# Patient Record
Sex: FEMALE | Race: WHITE | Employment: OTHER | URBAN - METROPOLITAN AREA
[De-identification: names, ages, dates, MRNs, and addresses within clinical notes are randomized per-mention and may not be internally consistent; named-entity substitution may affect disease eponyms.]

---

## 2017-07-26 ENCOUNTER — TELEPHONE (OUTPATIENT)
Dept: CARDIOLOGY CLINIC | Age: 77
End: 2017-07-26

## 2017-08-01 ENCOUNTER — TELEPHONE (OUTPATIENT)
Dept: CARDIOLOGY CLINIC | Age: 77
End: 2017-08-01

## 2017-08-09 ENCOUNTER — TELEPHONE (OUTPATIENT)
Dept: CARDIOLOGY CLINIC | Age: 77
End: 2017-08-09

## 2017-08-09 DIAGNOSIS — I35.0 NODULAR CALCIFIC AORTIC VALVE STENOSIS: Primary | ICD-10-CM

## 2017-08-14 ENCOUNTER — PROCEDURE VISIT (OUTPATIENT)
Dept: CARDIOLOGY CLINIC | Age: 77
End: 2017-08-14

## 2017-08-14 DIAGNOSIS — I35.0 NODULAR CALCIFIC AORTIC VALVE STENOSIS: Primary | ICD-10-CM

## 2017-08-14 LAB
LV EF: 58 %
LVEF MODALITY: NORMAL

## 2017-08-14 PROCEDURE — 93306 TTE W/DOPPLER COMPLETE: CPT | Performed by: INTERNAL MEDICINE

## 2017-08-28 ENCOUNTER — INITIAL CONSULT (OUTPATIENT)
Dept: CARDIOLOGY CLINIC | Age: 77
End: 2017-08-28

## 2017-08-28 VITALS
HEIGHT: 60 IN | SYSTOLIC BLOOD PRESSURE: 122 MMHG | HEART RATE: 64 BPM | DIASTOLIC BLOOD PRESSURE: 76 MMHG | WEIGHT: 146 LBS | BODY MASS INDEX: 28.66 KG/M2

## 2017-08-28 DIAGNOSIS — R06.02 SOBOE (SHORTNESS OF BREATH ON EXERTION): ICD-10-CM

## 2017-08-28 DIAGNOSIS — I35.0 AORTIC VALVE STENOSIS, UNSPECIFIED ETIOLOGY: Primary | ICD-10-CM

## 2017-08-28 PROCEDURE — 1090F PRES/ABSN URINE INCON ASSESS: CPT | Performed by: INTERNAL MEDICINE

## 2017-08-28 PROCEDURE — 93000 ELECTROCARDIOGRAM COMPLETE: CPT | Performed by: INTERNAL MEDICINE

## 2017-08-28 PROCEDURE — 4040F PNEUMOC VAC/ADMIN/RCVD: CPT | Performed by: INTERNAL MEDICINE

## 2017-08-28 PROCEDURE — G8419 CALC BMI OUT NRM PARAM NOF/U: HCPCS | Performed by: INTERNAL MEDICINE

## 2017-08-28 PROCEDURE — 99204 OFFICE O/P NEW MOD 45 MIN: CPT | Performed by: INTERNAL MEDICINE

## 2017-08-28 PROCEDURE — G8427 DOCREV CUR MEDS BY ELIG CLIN: HCPCS | Performed by: INTERNAL MEDICINE

## 2017-08-28 PROCEDURE — 1036F TOBACCO NON-USER: CPT | Performed by: INTERNAL MEDICINE

## 2017-08-30 ENCOUNTER — PROCEDURE VISIT (OUTPATIENT)
Dept: CARDIOLOGY CLINIC | Age: 77
End: 2017-08-30

## 2017-08-30 DIAGNOSIS — R06.02 SOBOE (SHORTNESS OF BREATH ON EXERTION): ICD-10-CM

## 2017-08-30 DIAGNOSIS — I35.0 AORTIC VALVE STENOSIS, UNSPECIFIED ETIOLOGY: ICD-10-CM

## 2017-08-30 LAB
LV EF: 60 %
LVEF MODALITY: NORMAL

## 2017-08-30 PROCEDURE — 93018 CV STRESS TEST I&R ONLY: CPT | Performed by: INTERNAL MEDICINE

## 2017-08-30 PROCEDURE — 93016 CV STRESS TEST SUPVJ ONLY: CPT | Performed by: INTERNAL MEDICINE

## 2017-08-30 PROCEDURE — A9500 TC99M SESTAMIBI: HCPCS | Performed by: INTERNAL MEDICINE

## 2017-08-30 PROCEDURE — 93017 CV STRESS TEST TRACING ONLY: CPT | Performed by: INTERNAL MEDICINE

## 2017-08-30 PROCEDURE — 78452 HT MUSCLE IMAGE SPECT MULT: CPT | Performed by: INTERNAL MEDICINE

## 2017-09-01 ENCOUNTER — TELEPHONE (OUTPATIENT)
Dept: CARDIOLOGY CLINIC | Age: 77
End: 2017-09-01

## 2017-09-27 ENCOUNTER — OFFICE VISIT (OUTPATIENT)
Dept: CARDIOLOGY CLINIC | Age: 77
End: 2017-09-27

## 2017-09-27 VITALS
HEART RATE: 60 BPM | DIASTOLIC BLOOD PRESSURE: 68 MMHG | SYSTOLIC BLOOD PRESSURE: 118 MMHG | HEIGHT: 61 IN | BODY MASS INDEX: 27.03 KG/M2 | WEIGHT: 143.2 LBS

## 2017-09-27 DIAGNOSIS — I35.0 AORTIC VALVE STENOSIS, UNSPECIFIED ETIOLOGY: Primary | ICD-10-CM

## 2017-09-27 PROCEDURE — 1036F TOBACCO NON-USER: CPT | Performed by: INTERNAL MEDICINE

## 2017-09-27 PROCEDURE — G8400 PT W/DXA NO RESULTS DOC: HCPCS | Performed by: INTERNAL MEDICINE

## 2017-09-27 PROCEDURE — 1123F ACP DISCUSS/DSCN MKR DOCD: CPT | Performed by: INTERNAL MEDICINE

## 2017-09-27 PROCEDURE — G8419 CALC BMI OUT NRM PARAM NOF/U: HCPCS | Performed by: INTERNAL MEDICINE

## 2017-09-27 PROCEDURE — 4040F PNEUMOC VAC/ADMIN/RCVD: CPT | Performed by: INTERNAL MEDICINE

## 2017-09-27 PROCEDURE — G8428 CUR MEDS NOT DOCUMENT: HCPCS | Performed by: INTERNAL MEDICINE

## 2017-09-27 PROCEDURE — 99214 OFFICE O/P EST MOD 30 MIN: CPT | Performed by: INTERNAL MEDICINE

## 2017-09-27 PROCEDURE — 1090F PRES/ABSN URINE INCON ASSESS: CPT | Performed by: INTERNAL MEDICINE

## 2018-04-24 ENCOUNTER — TELEPHONE (OUTPATIENT)
Dept: CARDIOLOGY CLINIC | Age: 78
End: 2018-04-24

## 2018-06-26 ENCOUNTER — OFFICE VISIT (OUTPATIENT)
Dept: CARDIOLOGY CLINIC | Age: 78
End: 2018-06-26

## 2018-06-26 VITALS
HEIGHT: 60 IN | SYSTOLIC BLOOD PRESSURE: 130 MMHG | HEART RATE: 60 BPM | WEIGHT: 144 LBS | BODY MASS INDEX: 28.27 KG/M2 | DIASTOLIC BLOOD PRESSURE: 78 MMHG

## 2018-06-26 DIAGNOSIS — I35.0 AORTIC VALVE STENOSIS, ETIOLOGY OF CARDIAC VALVE DISEASE UNSPECIFIED: Primary | ICD-10-CM

## 2018-06-26 DIAGNOSIS — R42 DIZZINESS: ICD-10-CM

## 2018-06-26 PROCEDURE — 99213 OFFICE O/P EST LOW 20 MIN: CPT | Performed by: INTERNAL MEDICINE

## 2018-06-26 PROCEDURE — 1123F ACP DISCUSS/DSCN MKR DOCD: CPT | Performed by: INTERNAL MEDICINE

## 2018-06-26 PROCEDURE — G8400 PT W/DXA NO RESULTS DOC: HCPCS | Performed by: INTERNAL MEDICINE

## 2018-06-26 PROCEDURE — 1036F TOBACCO NON-USER: CPT | Performed by: INTERNAL MEDICINE

## 2018-06-26 PROCEDURE — G8419 CALC BMI OUT NRM PARAM NOF/U: HCPCS | Performed by: INTERNAL MEDICINE

## 2018-06-26 PROCEDURE — 1090F PRES/ABSN URINE INCON ASSESS: CPT | Performed by: INTERNAL MEDICINE

## 2018-06-26 PROCEDURE — 4040F PNEUMOC VAC/ADMIN/RCVD: CPT | Performed by: INTERNAL MEDICINE

## 2018-06-26 PROCEDURE — G8427 DOCREV CUR MEDS BY ELIG CLIN: HCPCS | Performed by: INTERNAL MEDICINE

## 2018-06-26 RX ORDER — OXYBUTYNIN CHLORIDE 5 MG/1
5 TABLET ORAL 2 TIMES DAILY
COMMUNITY
Start: 2018-06-08

## 2018-07-06 ENCOUNTER — PROCEDURE VISIT (OUTPATIENT)
Dept: CARDIOLOGY CLINIC | Age: 78
End: 2018-07-06

## 2018-07-06 DIAGNOSIS — I35.0 AORTIC VALVE STENOSIS, ETIOLOGY OF CARDIAC VALVE DISEASE UNSPECIFIED: Primary | ICD-10-CM

## 2018-07-06 LAB
LV EF: 58 %
LVEF MODALITY: NORMAL

## 2018-07-06 PROCEDURE — 93306 TTE W/DOPPLER COMPLETE: CPT | Performed by: INTERNAL MEDICINE

## 2018-07-10 ENCOUNTER — TELEPHONE (OUTPATIENT)
Dept: CARDIOLOGY CLINIC | Age: 78
End: 2018-07-10

## 2018-07-10 ENCOUNTER — PROCEDURE VISIT (OUTPATIENT)
Dept: CARDIOLOGY CLINIC | Age: 78
End: 2018-07-10

## 2018-07-10 DIAGNOSIS — R42 DIZZINESS: Primary | ICD-10-CM

## 2018-07-10 PROCEDURE — 93880 EXTRACRANIAL BILAT STUDY: CPT | Performed by: INTERNAL MEDICINE

## 2018-07-10 NOTE — TELEPHONE ENCOUNTER
Left message on voice mail for patient to return call regarding results of echo.     Conclusions      Summary   Left ventricular systolic function is normal with an ejection fraction of   55-60%.    Grade I diastolic dysfunction.   severe aortic stenosis is present.   Doppler evaluation reveals moderate aortic, mild mitral, mild to moderate   tricuspid, and mild pulmonic regurgitation.   Right ventricular systolic pressure of 37 mmHg consistent with mild   pulmonary hypertension.   No evidence of pericardial effusion.   ov to discuss results

## 2018-07-11 ENCOUNTER — TELEPHONE (OUTPATIENT)
Dept: CARDIOLOGY CLINIC | Age: 78
End: 2018-07-11

## 2018-07-11 NOTE — TELEPHONE ENCOUNTER
Mild (0-49%) disease of the Bilateral Internal carotid artery.    Normal vertebral flow.      Left message with results

## 2018-07-20 NOTE — TELEPHONE ENCOUNTER
Spoke to patient regarding results of echo and carotid. Pt has an appt 7/30/18 regarding results of echo.

## 2018-07-30 ENCOUNTER — OFFICE VISIT (OUTPATIENT)
Dept: CARDIOLOGY CLINIC | Age: 78
End: 2018-07-30

## 2018-07-30 VITALS
BODY MASS INDEX: 29.06 KG/M2 | DIASTOLIC BLOOD PRESSURE: 70 MMHG | WEIGHT: 148 LBS | HEIGHT: 60 IN | SYSTOLIC BLOOD PRESSURE: 130 MMHG | HEART RATE: 76 BPM

## 2018-07-30 DIAGNOSIS — I35.0 AORTIC VALVE STENOSIS, ETIOLOGY OF CARDIAC VALVE DISEASE UNSPECIFIED: Primary | ICD-10-CM

## 2018-07-30 PROCEDURE — 1036F TOBACCO NON-USER: CPT | Performed by: INTERNAL MEDICINE

## 2018-07-30 PROCEDURE — 99214 OFFICE O/P EST MOD 30 MIN: CPT | Performed by: INTERNAL MEDICINE

## 2018-07-30 PROCEDURE — 1101F PT FALLS ASSESS-DOCD LE1/YR: CPT | Performed by: INTERNAL MEDICINE

## 2018-07-30 PROCEDURE — G8400 PT W/DXA NO RESULTS DOC: HCPCS | Performed by: INTERNAL MEDICINE

## 2018-07-30 PROCEDURE — G8419 CALC BMI OUT NRM PARAM NOF/U: HCPCS | Performed by: INTERNAL MEDICINE

## 2018-07-30 PROCEDURE — 1123F ACP DISCUSS/DSCN MKR DOCD: CPT | Performed by: INTERNAL MEDICINE

## 2018-07-30 PROCEDURE — G8427 DOCREV CUR MEDS BY ELIG CLIN: HCPCS | Performed by: INTERNAL MEDICINE

## 2018-07-30 PROCEDURE — 4040F PNEUMOC VAC/ADMIN/RCVD: CPT | Performed by: INTERNAL MEDICINE

## 2018-07-30 PROCEDURE — 1090F PRES/ABSN URINE INCON ASSESS: CPT | Performed by: INTERNAL MEDICINE

## 2018-07-30 NOTE — PROGRESS NOTES
valvular aortic stenosis. Dustin Melida H/O echocardiogram 08/14/2017    EF55-60% mod-severe AS, mild AR, mild-mod MR    History of blood transfusion     Hx of cardiovascular stress test 08/30/2017    EF 60% normal study    Hx of Doppler echocardiogram 07/06/2018    EF 55-60%  mod AR, mild MR, mild to mod TR, and mild pulmonic regurg. Mild pulmonary htn.  Hyperlipemia     Hypertension     Malaise and fatigue     Osteoarthritis     Restless leg     Sleep disorder     Spinal stenosis     VHD (valvular heart disease)      Past Surgical History:   Procedure Laterality Date    HYSTERECTOMY      OTHER SURGICAL HISTORY Left     Ear surgery     Family History   Problem Relation Age of Onset    Other Father         appendix ruptured    Heart Failure Mother         CHF    Hypertension Mother     Cancer Sister      Social History   Substance Use Topics    Smoking status: Never Smoker    Smokeless tobacco: Never Used    Alcohol use No      Comment: CAFFEINE: 3 cups daily        Review of Systems:   All 14 systems reviewed, all are negative   Objective:      Physical Exam:  /70   Pulse 76   Ht 5' (1.524 m)   Wt 148 lb (67.1 kg)   BMI 28.90 kg/m²   Wt Readings from Last 3 Encounters:   07/30/18 148 lb (67.1 kg)   06/26/18 144 lb (65.3 kg)   09/27/17 143 lb 3.2 oz (65 kg)     Body mass index is 28.9 kg/m². GENERAL - Alert, oriented, pleasant, in no apparent distress. Head unremarkable  Eyes  Not injected conjunctiva  ENT  normal mucosa  Neck - Supple. No jugular venous distention noted. No carotid bruits. Cardiovascular  Normal S1 and S2 with 3/6 THEODORE    Extremities - No cyanosis, clubbing, or significant edema. Pulmonary  No respiratory distress. No wheezes or rales. Pulses: Bilateral radial and pedal pulses normal  Abdomen  no tenderness  Musculoskeletal  normal strength  Neurologic    There are  no gross focal neurologic abnormalities.   Skin-  No rash  Affect; normal mood    DATA:  No results found for: CKTOTAL, CKMB, CKMBINDEX, TROPONINI  BNP:  No results found for: BNP  PT/INR:  No results found for: PTINR  No results found for: LABA1C  Lab Results   Component Value Date    CHOL 197 08/02/2010    TRIG 94 08/02/2010    HDL 80 (A) 08/02/2010    LDLCALC 98 08/02/2010     Lab Results   Component Value Date    ALT 25 08/02/2010    AST 30 08/02/2010     TSH:  No results found for: TSH      QUALITY MEASURES:  CAD:  No   CHOL LOWERING:  No- if No Why  ANTIPLATELET:  No - if No why  BETA BLOCKER    no  IF  NO WHY  SMOKING HISTORY no COUNSELLED no  ATRIAL FIBRILLATIONno ANTICOAG: no    Assessment/ Plan:     Patient seen , interviewed and examined      -  Hypertension: Patients blood pressure is normal. Patient is advised about low sodium diet. Present medical regimen will not be changed. -  LIPID MANAGEMENT:  Available lipid  lab data reviewed  and patient was given dietary advice. NCEP- ATP III guidelines reviewed with patient.     -   Changes  in medicines made: No        - VHD   Severe AS   On current echo  TAVR DW pt again, pt wants to wait till November 18

## 2018-08-20 ENCOUNTER — HOSPITAL ENCOUNTER (OUTPATIENT)
Dept: GENERAL RADIOLOGY | Age: 78
Discharge: HOME | End: 2018-08-20
Attending: FAMILY MEDICINE | Admitting: FAMILY MEDICINE

## 2018-08-20 LAB
ALBUMIN SERPL-MCNC: 4 GM/DL (ref 3.4–5)
ALP BLD-CCNC: 61 IU/L (ref 40–128)
ALT SERPL-CCNC: 14 U/L (ref 10–40)
ANION GAP SERPL CALCULATED.3IONS-SCNC: 15 MMOL/L (ref 4–16)
AST SERPL-CCNC: 22 IU/L (ref 15–37)
BASOPHILS ABSOLUTE: 0 K/CU MM
BASOPHILS RELATIVE PERCENT: 0.4 % (ref 0–1)
BILIRUB SERPL-MCNC: 0.8 MG/DL (ref 0–1)
BUN BLDV-MCNC: 19 MG/DL (ref 6–23)
CALCIUM SERPL-MCNC: 9.4 MG/DL (ref 8.3–10.6)
CHLORIDE BLD-SCNC: 102 MMOL/L (ref 99–110)
CHOLESTEROL: 167 MG/DL
CO2: 26 MMOL/L (ref 21–32)
CREAT SERPL-MCNC: 0.7 MG/DL (ref 0.6–1.1)
DIFFERENTIAL TYPE: ABNORMAL
EOSINOPHILS ABSOLUTE: 0.2 K/CU MM
EOSINOPHILS RELATIVE PERCENT: 3.2 % (ref 0–3)
ESTIMATED AVERAGE GLUCOSE: 117 MG/DL
GFR AFRICAN AMERICAN: >60 ML/MIN/1.73M2
GFR NON-AFRICAN AMERICAN: >60 ML/MIN/1.73M2
GLUCOSE BLD-MCNC: 91 MG/DL (ref 70–99)
HBA1C MFR BLD: 5.7 % (ref 4.2–6.3)
HCT VFR BLD CALC: 41.4 % (ref 37–47)
HDLC SERPL-MCNC: 52 MG/DL
HEMOGLOBIN: 13.4 GM/DL (ref 12.5–16)
IMMATURE NEUTROPHIL %: 0.3 % (ref 0–0.43)
LDL CHOLESTEROL DIRECT: 114 MG/DL
LYMPHOCYTES ABSOLUTE: 2.7 K/CU MM
LYMPHOCYTES RELATIVE PERCENT: 35.7 % (ref 24–44)
MCH RBC QN AUTO: 30.6 PG (ref 27–31)
MCHC RBC AUTO-ENTMCNC: 32.4 % (ref 32–36)
MCV RBC AUTO: 94.5 FL (ref 78–100)
MONOCYTES ABSOLUTE: 0.5 K/CU MM
MONOCYTES RELATIVE PERCENT: 6.8 % (ref 0–4)
NUCLEATED RBC %: 0 %
PDW BLD-RTO: 14.1 % (ref 11.7–14.9)
PLATELET # BLD: 213 K/CU MM (ref 140–440)
PMV BLD AUTO: 11.2 FL (ref 7.5–11.1)
POTASSIUM SERPL-SCNC: 4.4 MMOL/L (ref 3.5–5.1)
RBC # BLD: 4.38 M/CU MM (ref 4.2–5.4)
SEGMENTED NEUTROPHILS ABSOLUTE COUNT: 4 K/CU MM
SEGMENTED NEUTROPHILS RELATIVE PERCENT: 53.6 % (ref 36–66)
SODIUM BLD-SCNC: 143 MMOL/L (ref 135–145)
TOTAL IMMATURE NEUTOROPHIL: 0.02 K/CU MM
TOTAL NUCLEATED RBC: 0 K/CU MM
TOTAL PROTEIN: 6.1 GM/DL (ref 6.4–8.2)
TRIGL SERPL-MCNC: 108 MG/DL
TSH HIGH SENSITIVITY: 1.51 UIU/ML (ref 0.27–4.2)
WBC # BLD: 7.5 K/CU MM (ref 4–10.5)

## 2018-09-18 ENCOUNTER — TELEPHONE (OUTPATIENT)
Dept: CARDIOLOGY CLINIC | Age: 78
End: 2018-09-18

## 2018-09-19 ENCOUNTER — TELEPHONE (OUTPATIENT)
Dept: CARDIOLOGY CLINIC | Age: 78
End: 2018-09-19

## 2018-09-19 NOTE — TELEPHONE ENCOUNTER
Mailed out CD of echo from [facility] to:      [facility]  structual heart disease  Attn Jeremy [facility]avidez [facility] [address] [facility] , [address] [address] [place]

## 2018-09-26 LAB
BASOPHILS ABSOLUTE: NORMAL /ΜL
BASOPHILS RELATIVE PERCENT: NORMAL %
EOSINOPHILS ABSOLUTE: NORMAL /ΜL
EOSINOPHILS RELATIVE PERCENT: NORMAL %
HCT VFR BLD CALC: 40.1 % (ref 36–46)
HEMOGLOBIN: 13.3 G/DL (ref 12–16)
LYMPHOCYTES ABSOLUTE: NORMAL /ΜL
LYMPHOCYTES RELATIVE PERCENT: NORMAL %
MCH RBC QN AUTO: NORMAL PG
MCHC RBC AUTO-ENTMCNC: NORMAL G/DL
MCV RBC AUTO: NORMAL FL
MONOCYTES ABSOLUTE: NORMAL /ΜL
MONOCYTES RELATIVE PERCENT: NORMAL %
NEUTROPHILS ABSOLUTE: NORMAL /ΜL
NEUTROPHILS RELATIVE PERCENT: NORMAL %
PLATELET # BLD: 222 K/ΜL
PMV BLD AUTO: NORMAL FL
RBC # BLD: 4.3 10^6/ΜL
WBC # BLD: 7.35 10^3/ML

## 2018-11-07 LAB
ALBUMIN SERPL-MCNC: 4.3 G/DL
ALP BLD-CCNC: 65 U/L
ALT SERPL-CCNC: 18 U/L
ANION GAP SERPL CALCULATED.3IONS-SCNC: NORMAL MMOL/L
AST SERPL-CCNC: 22 U/L
BASOPHILS ABSOLUTE: NORMAL /ΜL
BASOPHILS RELATIVE PERCENT: NORMAL %
BILIRUB SERPL-MCNC: 0.5 MG/DL (ref 0.1–1.4)
BUN BLDV-MCNC: 17 MG/DL
CALCIUM SERPL-MCNC: NORMAL MG/DL
CHLORIDE BLD-SCNC: 102 MMOL/L
CO2: NORMAL MMOL/L
CREAT SERPL-MCNC: 0.72 MG/DL
EOSINOPHILS ABSOLUTE: NORMAL /ΜL
EOSINOPHILS RELATIVE PERCENT: NORMAL %
GFR CALCULATED: NORMAL
GLUCOSE BLD-MCNC: 97 MG/DL
HCT VFR BLD CALC: 39 % (ref 36–46)
HEMOGLOBIN: 13.2 G/DL (ref 12–16)
LYMPHOCYTES ABSOLUTE: NORMAL /ΜL
LYMPHOCYTES RELATIVE PERCENT: NORMAL %
MCH RBC QN AUTO: NORMAL PG
MCHC RBC AUTO-ENTMCNC: NORMAL G/DL
MCV RBC AUTO: NORMAL FL
MONOCYTES ABSOLUTE: NORMAL /ΜL
MONOCYTES RELATIVE PERCENT: NORMAL %
NEUTROPHILS ABSOLUTE: NORMAL /ΜL
NEUTROPHILS RELATIVE PERCENT: NORMAL %
PLATELET # BLD: 247 K/ΜL
PMV BLD AUTO: NORMAL FL
POTASSIUM SERPL-SCNC: 4.8 MMOL/L
RBC # BLD: 4.2 10^6/ΜL
SODIUM BLD-SCNC: 140 MMOL/L
TOTAL PROTEIN: NORMAL
WBC # BLD: 8.6 10^3/ML

## 2018-12-12 LAB
BASOPHILS ABSOLUTE: NORMAL /ΜL
BASOPHILS RELATIVE PERCENT: NORMAL %
BUN BLDV-MCNC: 16 MG/DL
CALCIUM SERPL-MCNC: 9.1 MG/DL
CHLORIDE BLD-SCNC: 105 MMOL/L
CO2: 25 MMOL/L
CREAT SERPL-MCNC: 0.66 MG/DL
EOSINOPHILS ABSOLUTE: NORMAL /ΜL
EOSINOPHILS RELATIVE PERCENT: NORMAL %
GFR CALCULATED: NORMAL
GLUCOSE BLD-MCNC: 62 MG/DL
HCT VFR BLD CALC: 38.3 % (ref 36–46)
HEMOGLOBIN: 12.6 G/DL (ref 12–16)
LYMPHOCYTES ABSOLUTE: NORMAL /ΜL
LYMPHOCYTES RELATIVE PERCENT: NORMAL %
MCH RBC QN AUTO: NORMAL PG
MCHC RBC AUTO-ENTMCNC: NORMAL G/DL
MCV RBC AUTO: NORMAL FL
MONOCYTES ABSOLUTE: NORMAL /ΜL
MONOCYTES RELATIVE PERCENT: NORMAL %
NEUTROPHILS ABSOLUTE: NORMAL /ΜL
NEUTROPHILS RELATIVE PERCENT: NORMAL %
PLATELET # BLD: 193 K/ΜL
PMV BLD AUTO: NORMAL FL
POTASSIUM SERPL-SCNC: 3.8 MMOL/L
RBC # BLD: 4.02 10^6/ΜL
SODIUM BLD-SCNC: 142 MMOL/L
WBC # BLD: 6.11 10^3/ML

## 2018-12-14 ENCOUNTER — HOSPITAL ENCOUNTER (OUTPATIENT)
Dept: CARDIAC REHAB | Age: 78
Setting detail: THERAPIES SERIES
Discharge: HOME | End: 2018-12-14
Payer: MEDICARE

## 2019-01-07 ENCOUNTER — HOSPITAL ENCOUNTER (OUTPATIENT)
Dept: CARDIAC REHAB | Age: 79
Setting detail: THERAPIES SERIES
Discharge: HOME | End: 2019-01-07
Payer: MEDICARE

## 2019-01-07 PROCEDURE — 93798 PHYS/QHP OP CAR RHAB W/ECG: CPT

## 2019-01-08 ENCOUNTER — HOSPITAL ENCOUNTER (OUTPATIENT)
Dept: CARDIAC REHAB | Age: 79
Setting detail: THERAPIES SERIES
Discharge: HOME | End: 2019-01-08
Payer: MEDICARE

## 2019-01-10 ENCOUNTER — HOSPITAL ENCOUNTER (OUTPATIENT)
Dept: CARDIAC REHAB | Age: 79
Setting detail: THERAPIES SERIES
Discharge: HOME | End: 2019-01-10
Payer: MEDICARE

## 2019-01-15 ENCOUNTER — HOSPITAL ENCOUNTER (OUTPATIENT)
Dept: CARDIAC REHAB | Age: 79
Setting detail: THERAPIES SERIES
Discharge: HOME | End: 2019-01-15
Payer: MEDICARE

## 2019-01-15 PROCEDURE — 93798 PHYS/QHP OP CAR RHAB W/ECG: CPT

## 2019-01-17 ENCOUNTER — HOSPITAL ENCOUNTER (OUTPATIENT)
Dept: CARDIAC REHAB | Age: 79
Setting detail: THERAPIES SERIES
Discharge: HOME | End: 2019-01-17
Payer: MEDICARE

## 2019-01-17 PROCEDURE — 93798 PHYS/QHP OP CAR RHAB W/ECG: CPT

## 2019-01-21 ENCOUNTER — APPOINTMENT (OUTPATIENT)
Dept: CARDIAC REHAB | Age: 79
End: 2019-01-21
Payer: MEDICARE

## 2019-01-22 ENCOUNTER — APPOINTMENT (OUTPATIENT)
Dept: CARDIAC REHAB | Age: 79
End: 2019-01-22
Payer: MEDICARE

## 2019-01-24 ENCOUNTER — APPOINTMENT (OUTPATIENT)
Dept: CARDIAC REHAB | Age: 79
End: 2019-01-24
Payer: MEDICARE

## 2019-01-28 ENCOUNTER — HOSPITAL ENCOUNTER (OUTPATIENT)
Dept: CARDIAC REHAB | Age: 79
Setting detail: THERAPIES SERIES
Discharge: HOME | End: 2019-01-28
Payer: MEDICARE

## 2019-01-28 PROCEDURE — 93798 PHYS/QHP OP CAR RHAB W/ECG: CPT

## 2019-01-29 ENCOUNTER — HOSPITAL ENCOUNTER (OUTPATIENT)
Dept: CARDIAC REHAB | Age: 79
Setting detail: THERAPIES SERIES
Discharge: HOME | End: 2019-01-29
Payer: MEDICARE

## 2019-01-29 PROCEDURE — 93798 PHYS/QHP OP CAR RHAB W/ECG: CPT

## 2019-02-04 ENCOUNTER — HOSPITAL ENCOUNTER (OUTPATIENT)
Dept: CARDIAC REHAB | Age: 79
Setting detail: THERAPIES SERIES
Discharge: HOME | End: 2019-02-04
Payer: MEDICARE

## 2019-02-04 PROCEDURE — 93798 PHYS/QHP OP CAR RHAB W/ECG: CPT

## 2019-02-05 ENCOUNTER — HOSPITAL ENCOUNTER (OUTPATIENT)
Dept: CARDIAC REHAB | Age: 79
Setting detail: THERAPIES SERIES
Discharge: HOME | End: 2019-02-05
Payer: MEDICARE

## 2019-02-05 PROCEDURE — 93798 PHYS/QHP OP CAR RHAB W/ECG: CPT

## 2019-02-07 ENCOUNTER — APPOINTMENT (OUTPATIENT)
Dept: CARDIAC REHAB | Age: 79
End: 2019-02-07
Payer: MEDICARE

## 2019-02-11 ENCOUNTER — HOSPITAL ENCOUNTER (OUTPATIENT)
Dept: CARDIAC REHAB | Age: 79
Setting detail: THERAPIES SERIES
Discharge: HOME | End: 2019-02-11
Payer: MEDICARE

## 2019-02-11 PROCEDURE — 93798 PHYS/QHP OP CAR RHAB W/ECG: CPT

## 2019-02-12 ENCOUNTER — HOSPITAL ENCOUNTER (OUTPATIENT)
Dept: CARDIAC REHAB | Age: 79
Setting detail: THERAPIES SERIES
Discharge: HOME | End: 2019-02-12
Payer: MEDICARE

## 2019-02-12 PROCEDURE — 93798 PHYS/QHP OP CAR RHAB W/ECG: CPT

## 2019-02-14 ENCOUNTER — HOSPITAL ENCOUNTER (OUTPATIENT)
Dept: CARDIAC REHAB | Age: 79
Setting detail: THERAPIES SERIES
Discharge: HOME | End: 2019-02-14
Payer: MEDICARE

## 2019-02-14 PROCEDURE — 93798 PHYS/QHP OP CAR RHAB W/ECG: CPT

## 2019-02-18 ENCOUNTER — APPOINTMENT (OUTPATIENT)
Dept: CARDIAC REHAB | Age: 79
End: 2019-02-18
Payer: MEDICARE

## 2019-02-19 ENCOUNTER — HOSPITAL ENCOUNTER (OUTPATIENT)
Dept: CARDIAC REHAB | Age: 79
Setting detail: THERAPIES SERIES
Discharge: HOME | End: 2019-02-19
Payer: MEDICARE

## 2019-02-19 PROCEDURE — 93798 PHYS/QHP OP CAR RHAB W/ECG: CPT

## 2019-02-21 ENCOUNTER — HOSPITAL ENCOUNTER (OUTPATIENT)
Dept: CARDIAC REHAB | Age: 79
Setting detail: THERAPIES SERIES
Discharge: HOME | End: 2019-02-21
Payer: MEDICARE

## 2019-02-21 PROCEDURE — 93798 PHYS/QHP OP CAR RHAB W/ECG: CPT

## 2019-02-25 ENCOUNTER — HOSPITAL ENCOUNTER (OUTPATIENT)
Dept: CARDIAC REHAB | Age: 79
Setting detail: THERAPIES SERIES
Discharge: HOME | End: 2019-02-25
Payer: MEDICARE

## 2019-02-25 PROCEDURE — 93798 PHYS/QHP OP CAR RHAB W/ECG: CPT

## 2019-02-26 ENCOUNTER — HOSPITAL ENCOUNTER (OUTPATIENT)
Dept: CARDIAC REHAB | Age: 79
Setting detail: THERAPIES SERIES
Discharge: HOME | End: 2019-02-26
Payer: MEDICARE

## 2019-02-26 PROCEDURE — 93798 PHYS/QHP OP CAR RHAB W/ECG: CPT

## 2019-02-28 ENCOUNTER — HOSPITAL ENCOUNTER (OUTPATIENT)
Dept: CARDIAC REHAB | Age: 79
Setting detail: THERAPIES SERIES
Discharge: HOME | End: 2019-02-28
Payer: MEDICARE

## 2019-02-28 PROCEDURE — 93798 PHYS/QHP OP CAR RHAB W/ECG: CPT

## 2019-03-07 ENCOUNTER — APPOINTMENT (OUTPATIENT)
Dept: CARDIAC REHAB | Age: 79
End: 2019-03-07
Payer: MEDICARE

## 2019-03-11 ENCOUNTER — APPOINTMENT (OUTPATIENT)
Dept: CARDIAC REHAB | Age: 79
End: 2019-03-11
Payer: MEDICARE

## 2019-03-12 ENCOUNTER — APPOINTMENT (OUTPATIENT)
Dept: CARDIAC REHAB | Age: 79
End: 2019-03-12
Payer: MEDICARE

## 2019-03-14 ENCOUNTER — APPOINTMENT (OUTPATIENT)
Dept: CARDIAC REHAB | Age: 79
End: 2019-03-14
Payer: MEDICARE

## 2019-03-18 ENCOUNTER — APPOINTMENT (OUTPATIENT)
Dept: CARDIAC REHAB | Age: 79
End: 2019-03-18
Payer: MEDICARE

## 2019-03-19 ENCOUNTER — APPOINTMENT (OUTPATIENT)
Dept: CARDIAC REHAB | Age: 79
End: 2019-03-19
Payer: MEDICARE

## 2019-03-21 ENCOUNTER — APPOINTMENT (OUTPATIENT)
Dept: CARDIAC REHAB | Age: 79
End: 2019-03-21
Payer: MEDICARE

## 2019-03-25 ENCOUNTER — APPOINTMENT (OUTPATIENT)
Dept: CARDIAC REHAB | Age: 79
End: 2019-03-25
Payer: MEDICARE

## 2019-03-26 ENCOUNTER — APPOINTMENT (OUTPATIENT)
Dept: CARDIAC REHAB | Age: 79
End: 2019-03-26
Payer: MEDICARE

## 2019-03-28 ENCOUNTER — APPOINTMENT (OUTPATIENT)
Dept: CARDIAC REHAB | Age: 79
End: 2019-03-28
Payer: MEDICARE

## 2019-03-28 ENCOUNTER — HOSPITAL ENCOUNTER (OUTPATIENT)
Dept: CARDIAC REHAB | Age: 79
Setting detail: THERAPIES SERIES
Discharge: HOME | End: 2019-03-28
Payer: MEDICARE

## 2019-03-28 PROCEDURE — 93798 PHYS/QHP OP CAR RHAB W/ECG: CPT

## 2019-04-22 ENCOUNTER — HOSPITAL ENCOUNTER (OUTPATIENT)
Dept: CARDIAC REHAB | Age: 79
Setting detail: THERAPIES SERIES
Discharge: HOME | End: 2019-04-22
Payer: MEDICARE

## 2019-04-22 PROCEDURE — 93798 PHYS/QHP OP CAR RHAB W/ECG: CPT

## 2019-04-23 ENCOUNTER — HOSPITAL ENCOUNTER (OUTPATIENT)
Dept: CARDIAC REHAB | Age: 79
Setting detail: THERAPIES SERIES
Discharge: HOME | End: 2019-04-23
Payer: MEDICARE

## 2019-04-23 PROCEDURE — 93798 PHYS/QHP OP CAR RHAB W/ECG: CPT

## 2019-04-25 ENCOUNTER — HOSPITAL ENCOUNTER (OUTPATIENT)
Dept: CARDIAC REHAB | Age: 79
Setting detail: THERAPIES SERIES
Discharge: HOME | End: 2019-04-25
Payer: MEDICARE

## 2019-04-25 PROCEDURE — 93798 PHYS/QHP OP CAR RHAB W/ECG: CPT

## 2019-04-29 ENCOUNTER — HOSPITAL ENCOUNTER (OUTPATIENT)
Dept: CARDIAC REHAB | Age: 79
Setting detail: THERAPIES SERIES
Discharge: HOME | End: 2019-04-29
Payer: MEDICARE

## 2019-04-29 PROCEDURE — 93798 PHYS/QHP OP CAR RHAB W/ECG: CPT

## 2019-04-30 ENCOUNTER — APPOINTMENT (OUTPATIENT)
Dept: CARDIAC REHAB | Age: 79
End: 2019-04-30
Payer: MEDICARE

## 2019-05-02 ENCOUNTER — HOSPITAL ENCOUNTER (OUTPATIENT)
Dept: CARDIAC REHAB | Age: 79
Setting detail: THERAPIES SERIES
Discharge: HOME | End: 2019-05-02
Payer: MEDICARE

## 2019-05-02 PROCEDURE — 93798 PHYS/QHP OP CAR RHAB W/ECG: CPT

## 2019-05-06 ENCOUNTER — HOSPITAL ENCOUNTER (OUTPATIENT)
Dept: CARDIAC REHAB | Age: 79
Setting detail: THERAPIES SERIES
Discharge: HOME | End: 2019-05-06
Payer: MEDICARE

## 2019-05-06 PROCEDURE — 93798 PHYS/QHP OP CAR RHAB W/ECG: CPT

## 2019-05-13 ENCOUNTER — HOSPITAL ENCOUNTER (OUTPATIENT)
Dept: CARDIAC REHAB | Age: 79
Setting detail: THERAPIES SERIES
Discharge: HOME | End: 2019-05-13
Payer: MEDICARE

## 2019-05-13 PROCEDURE — 93798 PHYS/QHP OP CAR RHAB W/ECG: CPT

## 2019-05-14 ENCOUNTER — HOSPITAL ENCOUNTER (OUTPATIENT)
Dept: CARDIAC REHAB | Age: 79
Setting detail: THERAPIES SERIES
Discharge: HOME | End: 2019-05-14
Payer: MEDICARE

## 2019-05-14 PROCEDURE — 93798 PHYS/QHP OP CAR RHAB W/ECG: CPT

## 2019-05-21 ENCOUNTER — APPOINTMENT (OUTPATIENT)
Dept: CARDIAC REHAB | Age: 79
End: 2019-05-21
Payer: MEDICARE

## 2019-05-23 ENCOUNTER — APPOINTMENT (OUTPATIENT)
Dept: CARDIAC REHAB | Age: 79
End: 2019-05-23
Payer: MEDICARE

## 2019-05-27 ENCOUNTER — APPOINTMENT (OUTPATIENT)
Dept: CARDIAC REHAB | Age: 79
End: 2019-05-27
Payer: MEDICARE

## 2019-05-28 ENCOUNTER — APPOINTMENT (OUTPATIENT)
Dept: CARDIAC REHAB | Age: 79
End: 2019-05-28
Payer: MEDICARE

## 2019-05-28 ENCOUNTER — HOSPITAL ENCOUNTER (EMERGENCY)
Age: 79
Discharge: HOME | End: 2019-05-28
Attending: EMERGENCY MEDICINE
Payer: MEDICARE

## 2019-05-28 ENCOUNTER — APPOINTMENT (OUTPATIENT)
Dept: ULTRASOUND IMAGING | Age: 79
End: 2019-05-28
Payer: MEDICARE

## 2019-05-28 VITALS
WEIGHT: 148 LBS | HEIGHT: 60 IN | SYSTOLIC BLOOD PRESSURE: 162 MMHG | RESPIRATION RATE: 18 BRPM | HEART RATE: 60 BPM | OXYGEN SATURATION: 97 % | TEMPERATURE: 98 F | DIASTOLIC BLOOD PRESSURE: 83 MMHG | BODY MASS INDEX: 29.06 KG/M2

## 2019-05-28 DIAGNOSIS — M79.89 LEG SWELLING: Primary | ICD-10-CM

## 2019-05-28 PROCEDURE — 93971 EXTREMITY STUDY: CPT

## 2019-05-28 PROCEDURE — 99283 EMERGENCY DEPT VISIT LOW MDM: CPT

## 2019-05-29 NOTE — ED PROVIDER NOTES
Emergency Department Encounter    Patient: Shani Canales  MRN: [id number]  : 1940  Date of Evaluation: 2019  ED Provider:  Krystina Matson    Triage Chief Complaint:   Leg Swelling (left leg swelling)    [place]:  Shani Canales is a 78 y.o. 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 Not having any pain associated with it. Does have arthritis of both knees and is scheduled to have cortisone injections tomorrow for this. No pain in the leg. No redness or rashes. No foot pain. No feeling like the leg is cold or numb. No recent illnesses. No SOB or CP. Denies other complaints. No right side swelling. ROS:  10 systems reviewed and negative except as above    Past Medical History:   Diagnosis Date    Chest discomfort     H/O 24 hour EKG monitoring 2009 - Baseline rhythm mechanism is normal sinus. Breif episodes of SVT & sinus tachycardia noted. No clinically significant arrhythmias seen. No untoward symptoms charted in patient's diary.  H/O cardiovascular stress test 2009 - Normal pattern of perfusion in all regions. LV is normal in size. No wall motion abnormality seen. LVSF is normal. Exercise capcity 8 METS. Exercise capacity is normal. No ECG changes.  H/O Doppler ultrasound 07/10/2018    carotid - bilateral mild disease    H/O echocardiogram 2012, 2010 - LVSF is normal. EF = >55%. Impaired LV relaxation. Mild TR, pulmonary HTN, aortic valve calcification & valvular aortic stenosis. Mak Martinez H/O echocardiogram 2017    EF55-60% mod-severe AS, mild AR, mild-mod MR    History of blood transfusion     Hx of cardiovascular stress test 2017    EF 60% normal study    Hx of Doppler echocardiogram 2018    EF 55-60%  mod AR, mild MR, mild to mod TR, and mild pulmonic regurg. Mild pulmonary htn.    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    [place]    HYSTERECTOMY      OTHER SURGICAL HISTORY Left     Ear surgery     Family History   Problem Relation Age of Onset    Other Father         appendix ruptured    Heart Failure Mother         CHF    Hypertension Mother     Cancer Sister      Social History     Socioeconomic History    Marital status:       Spouse name: Not on file    Number of children: Not on file    Years of education: Not on file    Highest education level: Not on file   Occupational History    Occupation: Retired   Social Needs    Financial resource strain: Not on file    Food insecurity:     Worry: Not on file     Inability: Not on file   Unruly Â® needs:     Medical: Not on file     Non-medical: Not on file   Tobacco Use    Smoking status: Never Smoker    Smokeless tobacco: Never Used   Substance and Sexual Activity    Alcohol use: No     Comment: CAFFEINE: 3 cups daily    Drug use: No    Sexual activity: Not on file   Lifestyle    Physical activity:     Days per week: Not on file     Minutes per session: Not on file    Stress: Not on file   Relationships    Social connections:     Talks on phone: Not on file     Gets together: Not on file     Attends Denominational service: Not on file     Active member of club or organization: Not on file     Attends meetings of clubs or organizations: Not on file     Relationship status: Not on file    Intimate partner violence:     Fear of current or ex partner: Not on file     Emotionally abused: Not on file     Physically abused: Not on file     Forced sexual activity: Not on file   Other Topics Concern    Not on file   Social History Narrative    Not on file     No current facility-administered medications for this encounter. Current Outpatient Medications   Medication Sig Dispense Refill    oxybutynin (DITROPAN) 5 MG tablet Take 5 mg by mouth daily      Multiple Vitamins-Minerals (THERAPEUTIC MULTIVITAMIN-MINERALS) tablet Take 1 tablet by mouth daily      CALCIUM PO Take  by mouth daily.  GLUCOSAMINE-CHONDROITIN PO Take  by mouth daily.  Cyanocobalamin (VITAMIN B-12 PO) Take 2,500 mg by mouth daily       FLUoxetine (PROZAC) 10 MG tablet Take 10 mg by mouth daily.  simvastatin (ZOCOR) 40 MG tablet Take 40 mg by mouth nightly.  metoprolol (LOPRESSOR) 25 MG tablet Take 25 mg by mouth 2 times daily. Allergies   Allergen Reactions    Tape Petra Ng Tape]        Nursing Notes Reviewed    Physical Exam:  Triage VS:    ED Triage Vitals [05/28/19 2125]   Enc Vitals Group      BP (!) 162/83      Pulse 60      Resp 18      Temp 98 °F (36.7 °C)      Temp Source Oral      SpO2 97 %      Weight 148 lb (67.1 kg)      Height 5' (1.524 m)      Head Circumference       Peak Flow       Pain Score       Pain Loc       Pain Edu? Excl. in [address]? My pulse ox interpretation is - normal    General appearance:  No acute distress. Skin:  Warm. Dry. Eye:  Extraocular movements intact. Ears, nose, mouth and throat:  Oral mucosa moist   Neck:  Trachea midline. Extremity:  Very minimal swelling over the left calf compared to right without pain to palpation, no skin changes or discoloration. Left knee slightly more swollen than right without pain with palpation, normal ROM without difficulty, no instability to the joint with anterior and posterior drawer. Left ankle without signifiant swelling. Pulses intact at bilateral DP and PT and equal. Sensation intact to light touch in bilateral feet, both are warm to touch, capillary refill is equal. Strength intact at ankles. Normal ROM  Right leg.    Heart:  Regular rate and rhythm  Perfusion: intact  Respiratory:   Respirations nonlabored. Abdominal:   Non distended. Neurological:  Alert and oriented           Psychiatric:  Appropriate    I have reviewed and interpreted all of the currently available lab results from this visit (if applicable):  No results found for this visit on 05/28/19. Radiographs (if obtained):    [x] Radiologist's Report Reviewed:  VL DUP LOWER EXTREMITY VENOUS LEFT   Final Result   No evidence of DVT in the left lower extremity. EKG (if obtained): (All EKG's are interpreted by myself in the absence of a cardiologist)    Chart review shows recent radiographs:  Vl Dup Lower Extremity Venous Left    Result Date: 5/28/2019  EXAMINATION: DUPLEX VENOUS ULTRASOUND OF THE LEFT LOWER EXTREMITY 5/28/2019 10:00 pm TECHNIQUE: Duplex ultrasound and Doppler images were obtained of the left lower extremity. COMPARISON: None. HISTORY: ORDERING SYSTEM PROVIDED HISTORY: pain TECHNOLOGIST PROVIDED HISTORY: Reason for exam:->pain Ordering Physician Provided Reason for Exam: Left leg acute swelling. No pain Acuity: Acute Type of Exam: Initial FINDINGS: The visualized veins of the left lower extremity are patent and free of echogenic thrombus. The veins are normally compressible and have normal phasic flow. No evidence of DVT in the left lower extremity. MDM:  78year-old female history of the present concern for left leg swelling, she is in no acute distress with no pain, very minimal left calf swelling, does have some left knee swelling compared to right, does have known arthritis of both knees and is due to have cortisone injections tomorrow with Dr. Lizette Canales. She is in no acute distress, had no pain whatsoever. Ultrasound shows no evidence of DVT. I see no evidence of cellulitis or deep tissue infection. Her pulses are intact, I'm not concerned for arterial problem.   Possible that with the arthritis from her knee she has some dependent swelling related to that and we did discuss this. Discussed her compression stockings versus Ace wrap, rest, ice and elevation at home, she actually has her appointment with the orthopedist tomorrow. Did discuss if develops pain, skin changes, fevers or other concerns should return here immediately and she understands this as do her family members at bedside. She will be discharged in stable condition. All questions answered    Clinical Impression:  1. Leg swelling      Disposition referral (if applicable):  Charissa Taylor MD  [address]  [address] [facility].  [phone]    Schedule an appointment as soon as possible for a visit       your orthopedic doctor          [facility] Emergency Department  [facility] [phone]    If symptoms worsen    Disposition medications (if applicable):  Discharge Medication List as of 5/28/2019 11:49 PM          Comment: Please note this report has been produced using speech recognition software and may contain errors related to that system including errors in grammar, punctuation, and spelling, as well as words and phrases that may be inappropriate. Efforts were made to edit the dictations.         Mason Helm MD  05/29/19 [id number]

## 2019-05-30 ENCOUNTER — APPOINTMENT (OUTPATIENT)
Dept: CARDIAC REHAB | Age: 79
End: 2019-05-30
Payer: MEDICARE

## 2019-06-03 ENCOUNTER — APPOINTMENT (OUTPATIENT)
Dept: CARDIAC REHAB | Age: 79
End: 2019-06-03
Payer: MEDICARE

## 2019-06-03 ENCOUNTER — HOSPITAL ENCOUNTER (OUTPATIENT)
Dept: CARDIAC REHAB | Age: 79
Setting detail: THERAPIES SERIES
Discharge: HOME | End: 2019-06-03
Payer: MEDICARE

## 2019-06-03 PROCEDURE — 93798 PHYS/QHP OP CAR RHAB W/ECG: CPT

## 2019-06-04 ENCOUNTER — APPOINTMENT (OUTPATIENT)
Dept: CARDIAC REHAB | Age: 79
End: 2019-06-04
Payer: MEDICARE

## 2019-06-04 ENCOUNTER — HOSPITAL ENCOUNTER (OUTPATIENT)
Dept: CARDIAC REHAB | Age: 79
Setting detail: THERAPIES SERIES
Discharge: HOME | End: 2019-06-04
Payer: MEDICARE

## 2019-06-04 PROCEDURE — 93798 PHYS/QHP OP CAR RHAB W/ECG: CPT

## 2019-06-06 ENCOUNTER — HOSPITAL ENCOUNTER (OUTPATIENT)
Dept: CARDIAC REHAB | Age: 79
Setting detail: THERAPIES SERIES
Discharge: HOME | End: 2019-06-06
Payer: MEDICARE

## 2019-06-06 ENCOUNTER — APPOINTMENT (OUTPATIENT)
Dept: CARDIAC REHAB | Age: 79
End: 2019-06-06
Payer: MEDICARE

## 2019-06-06 PROCEDURE — 93798 PHYS/QHP OP CAR RHAB W/ECG: CPT

## 2019-06-10 ENCOUNTER — HOSPITAL ENCOUNTER (OUTPATIENT)
Dept: CARDIAC REHAB | Age: 79
Setting detail: THERAPIES SERIES
Discharge: HOME | End: 2019-06-10
Payer: MEDICARE

## 2019-06-10 PROCEDURE — 93798 PHYS/QHP OP CAR RHAB W/ECG: CPT

## 2019-06-11 ENCOUNTER — HOSPITAL ENCOUNTER (OUTPATIENT)
Dept: CARDIAC REHAB | Age: 79
Setting detail: THERAPIES SERIES
Discharge: HOME | End: 2019-06-11
Payer: MEDICARE

## 2019-06-11 PROCEDURE — 93798 PHYS/QHP OP CAR RHAB W/ECG: CPT

## 2019-06-13 ENCOUNTER — HOSPITAL ENCOUNTER (OUTPATIENT)
Dept: CARDIAC REHAB | Age: 79
Setting detail: THERAPIES SERIES
Discharge: HOME | End: 2019-06-13
Payer: MEDICARE

## 2019-06-13 PROCEDURE — 93798 PHYS/QHP OP CAR RHAB W/ECG: CPT

## 2019-06-17 ENCOUNTER — HOSPITAL ENCOUNTER (OUTPATIENT)
Dept: CARDIAC REHAB | Age: 79
Setting detail: THERAPIES SERIES
Discharge: HOME | End: 2019-06-17
Payer: MEDICARE

## 2019-06-17 PROCEDURE — 93798 PHYS/QHP OP CAR RHAB W/ECG: CPT

## 2019-06-17 NOTE — TELEPHONE ENCOUNTER
The patient needs refill on her lopressor 25 mg and sent to [facility] and a 90 day supply of her medication .  Also she is about out of her mediation she stated

## 2019-06-20 ENCOUNTER — HOSPITAL ENCOUNTER (OUTPATIENT)
Dept: CARDIAC REHAB | Age: 79
Setting detail: THERAPIES SERIES
Discharge: HOME | End: 2019-06-20
Payer: MEDICARE

## 2019-06-20 PROCEDURE — 93798 PHYS/QHP OP CAR RHAB W/ECG: CPT

## 2019-07-01 ENCOUNTER — HOSPITAL ENCOUNTER (OUTPATIENT)
Dept: CARDIAC REHAB | Age: 79
Setting detail: THERAPIES SERIES
Discharge: HOME | End: 2019-07-01
Payer: MEDICARE

## 2019-07-01 PROCEDURE — 93798 PHYS/QHP OP CAR RHAB W/ECG: CPT

## 2019-07-16 ENCOUNTER — OFFICE VISIT (OUTPATIENT)
Dept: CARDIOLOGY CLINIC | Age: 79
End: 2019-07-16
Payer: MEDICARE

## 2019-07-16 VITALS
SYSTOLIC BLOOD PRESSURE: 120 MMHG | DIASTOLIC BLOOD PRESSURE: 72 MMHG | HEART RATE: 72 BPM | HEIGHT: 62 IN | BODY MASS INDEX: 27.34 KG/M2 | WEIGHT: 148.6 LBS

## 2019-07-16 DIAGNOSIS — I35.0 AORTIC VALVE STENOSIS, ETIOLOGY OF CARDIAC VALVE DISEASE UNSPECIFIED: Primary | ICD-10-CM

## 2019-07-16 PROCEDURE — G8419 CALC BMI OUT NRM PARAM NOF/U: HCPCS | Performed by: INTERNAL MEDICINE

## 2019-07-16 PROCEDURE — 99214 OFFICE O/P EST MOD 30 MIN: CPT | Performed by: INTERNAL MEDICINE

## 2019-07-16 PROCEDURE — G8400 PT W/DXA NO RESULTS DOC: HCPCS | Performed by: INTERNAL MEDICINE

## 2019-07-16 PROCEDURE — 1090F PRES/ABSN URINE INCON ASSESS: CPT | Performed by: INTERNAL MEDICINE

## 2019-07-16 PROCEDURE — G8427 DOCREV CUR MEDS BY ELIG CLIN: HCPCS | Performed by: INTERNAL MEDICINE

## 2019-07-16 PROCEDURE — 4040F PNEUMOC VAC/ADMIN/RCVD: CPT | Performed by: INTERNAL MEDICINE

## 2019-07-16 PROCEDURE — 1036F TOBACCO NON-USER: CPT | Performed by: INTERNAL MEDICINE

## 2019-07-16 PROCEDURE — 1123F ACP DISCUSS/DSCN MKR DOCD: CPT | Performed by: INTERNAL MEDICINE

## 2019-07-16 RX ORDER — OYSTER SHELL CALCIUM WITH VITAMIN D 500; 200 MG/1; [IU]/1
1 TABLET, FILM COATED ORAL DAILY
COMMUNITY
End: 2020-01-16

## 2019-07-16 RX ORDER — CLOPIDOGREL BISULFATE 75 MG/1
75 TABLET ORAL DAILY
COMMUNITY
End: 2020-01-16

## 2019-07-16 RX ORDER — AMPICILLIN 500 MG/1
500 CAPSULE ORAL 4 TIMES DAILY
COMMUNITY
End: 2020-01-16

## 2019-07-16 RX ORDER — LANOLIN ALCOHOL/MO/W.PET/CERES
3 CREAM (GRAM) TOPICAL DAILY
COMMUNITY

## 2019-07-16 RX ORDER — AZITHROMYCIN 250 MG/1
250 TABLET, FILM COATED ORAL DAILY
COMMUNITY
End: 2020-01-16

## 2019-07-16 NOTE — PROGRESS NOTES
Flaquito Atkinsonble tape]  Past Medical History:   Diagnosis Date    Chest discomfort     H/O 24 hour EKG monitoring 1/6/2009 1/6/2009 - Baseline rhythm mechanism is normal sinus. Breif episodes of SVT & sinus tachycardia noted. No clinically significant arrhythmias seen. No untoward symptoms charted in patient's diary.  H/O cardiovascular stress test 1/6/2009 1/6/2009 - Normal pattern of perfusion in all regions. LV is normal in size. No wall motion abnormality seen. LVSF is normal. Exercise capcity 8 METS. Exercise capacity is normal. No ECG changes.  H/O Doppler ultrasound 07/10/2018    carotid - bilateral mild disease    H/O echocardiogram 4/24/2012, 1/5/2010 4/24/2012 - LVSF is normal. EF = >55%. Impaired LV relaxation. Mild TR, pulmonary HTN, aortic valve calcification & valvular aortic stenosis. Ethan Ray H/O echocardiogram 08/14/2017    EF55-60% mod-severe AS, mild AR, mild-mod MR    History of blood transfusion     Hx of cardiovascular stress test 08/30/2017    EF 60% normal study    Hx of Doppler echocardiogram 07/06/2018    EF 55-60%  mod AR, mild MR, mild to mod TR, and mild pulmonic regurg. Mild pulmonary htn.    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    [place]    HYSTERECTOMY      OTHER SURGICAL HISTORY Left     Ear surgery      As reviewed   Family History   Problem Relation Age of Onset    Other Father         appendix ruptured    Heart Failure Mother         CHF    Hypertension Mother     Cancer Sister      Social History     Tobacco Use    Smoking status: Never Smoker    Smokeless tobacco: Never Used   Substance Use Topics    Alcohol use: No     Comment: CAFFEINE: 3 cups Review   No results found for: CKTOTAL, CKMB, CKMBINDEX, TROPONINT  BNP:  No results found for: BNP  PT/INR:  No results found for: INR  Lab Results   Component Value Date    LABA1C 5.7 08/20/2018     Lab Results   Component Value Date    WBC 6.11 12/12/2018    HCT 38.3 12/12/2018    MCV 94.5 08/20/2018     12/12/2018     Lab Results   Component Value Date    CHOL 167 08/20/2018    TRIG 108 08/20/2018    HDL 52 08/20/2018    LDLCALC 98 08/02/2010    LDLDIRECT 114 (H) 08/20/2018     Lab Results   Component Value Date    ALT 18 11/07/2018    AST 22 11/07/2018     BMP:    Lab Results   Component Value Date     12/12/2018    K 3.8 12/12/2018     12/12/2018    CO2 25 12/12/2018    BUN 16 12/12/2018    CREATININE 0.66 12/12/2018     CMP:   Lab Results   Component Value Date     12/12/2018    K 3.8 12/12/2018     12/12/2018    CO2 25 12/12/2018    BUN 16 12/12/2018    PROT 6.1 08/20/2018     TSH:    Lab Results   Component Value Date    TSHHS 1.510 08/20/2018         Impression:    No diagnosis found. Patient Active Problem List   Diagnosis Code    Aortic stenosis I35.0    SOBOE (shortness of breath on exertion) R06.02    Dizziness R42       Assessment & Plan:    - VHD  SP  TAVR  echo    -  Hypertension: Patients blood pressure is normal. Patient is advised about low sodium diet. Present medical regimen will not be changed. -  LIPID MANAGEMENT:  Available lipid  lab data reviewed  and patient was given dietary advice. NCEP- ATP III guidelines reviewed with patient. -   Changes  in medicines made:  No                                                          Preston Glasgow MA  [facility]

## 2019-07-29 ENCOUNTER — TELEPHONE (OUTPATIENT)
Dept: CARDIOLOGY CLINIC | Age: 79
End: 2019-07-29

## 2019-08-01 ENCOUNTER — PROCEDURE VISIT (OUTPATIENT)
Dept: CARDIOLOGY CLINIC | Age: 79
End: 2019-08-01
Payer: MEDICARE

## 2019-08-01 DIAGNOSIS — I35.0 AORTIC VALVE STENOSIS, ETIOLOGY OF CARDIAC VALVE DISEASE UNSPECIFIED: Primary | ICD-10-CM

## 2019-08-01 LAB
LV EF: 58 %
LVEF MODALITY: NORMAL

## 2019-08-01 PROCEDURE — 93306 TTE W/DOPPLER COMPLETE: CPT | Performed by: INTERNAL MEDICINE

## 2019-08-05 ENCOUNTER — TELEPHONE (OUTPATIENT)
Dept: CARDIOLOGY CLINIC | Age: 79
End: 2019-08-05

## 2020-01-14 LAB
CHOLESTEROL, TOTAL: 181 MG/DL
CHOLESTEROL/HDL RATIO: NORMAL
HDLC SERPL-MCNC: 64 MG/DL (ref 35–70)
LDL CHOLESTEROL CALCULATED: 90 MG/DL (ref 0–160)
NONHDLC SERPL-MCNC: NORMAL MG/DL
TRIGL SERPL-MCNC: 135 MG/DL
VLDLC SERPL CALC-MCNC: 27 MG/DL

## 2020-01-16 ENCOUNTER — OFFICE VISIT (OUTPATIENT)
Dept: CARDIOLOGY CLINIC | Age: 80
End: 2020-01-16
Payer: MEDICARE

## 2020-01-16 VITALS
HEIGHT: 60 IN | HEART RATE: 64 BPM | WEIGHT: 151 LBS | BODY MASS INDEX: 29.64 KG/M2 | SYSTOLIC BLOOD PRESSURE: 130 MMHG | DIASTOLIC BLOOD PRESSURE: 78 MMHG

## 2020-01-16 PROCEDURE — 4040F PNEUMOC VAC/ADMIN/RCVD: CPT | Performed by: INTERNAL MEDICINE

## 2020-01-16 PROCEDURE — 99214 OFFICE O/P EST MOD 30 MIN: CPT | Performed by: INTERNAL MEDICINE

## 2020-01-16 PROCEDURE — 1036F TOBACCO NON-USER: CPT | Performed by: INTERNAL MEDICINE

## 2020-01-16 PROCEDURE — G8417 CALC BMI ABV UP PARAM F/U: HCPCS | Performed by: INTERNAL MEDICINE

## 2020-01-16 PROCEDURE — G8400 PT W/DXA NO RESULTS DOC: HCPCS | Performed by: INTERNAL MEDICINE

## 2020-01-16 PROCEDURE — 1123F ACP DISCUSS/DSCN MKR DOCD: CPT | Performed by: INTERNAL MEDICINE

## 2020-01-16 PROCEDURE — 93000 ELECTROCARDIOGRAM COMPLETE: CPT | Performed by: INTERNAL MEDICINE

## 2020-01-16 PROCEDURE — G8484 FLU IMMUNIZE NO ADMIN: HCPCS | Performed by: INTERNAL MEDICINE

## 2020-01-16 PROCEDURE — G8427 DOCREV CUR MEDS BY ELIG CLIN: HCPCS | Performed by: INTERNAL MEDICINE

## 2020-01-16 PROCEDURE — 1090F PRES/ABSN URINE INCON ASSESS: CPT | Performed by: INTERNAL MEDICINE

## 2020-01-16 NOTE — LETTER
CLINICAL STAFF DOCUMENTATION    Bob Brito  1940  [id number]    Have you had any Chest Pain - No       Have you had any Shortness of Breath - No       Have you had any dizziness - No       Have you had any palpitations - No     Is the patient on any of the following medications - NONE  If Yes DO EKG    Do you have any edema - swelling in NONE    If Yes - CHECK TO SEE IF THE EDEMA IS PITTING    Check Venous \"LEG PROBLEM Questionnaire\"    Do you have a surgery or procedure scheduled in the near future - No  If Yes- DO EKG  Ask patient [facility] not [facility]    Check to see if we have an E-MAIL on file for the patient    Check medication list thoroughly!!!  BE SURE TO ASK PATIENT IF THEY NEED MEDICATION REFILLS

## 2020-01-20 ENCOUNTER — TELEPHONE (OUTPATIENT)
Dept: CARDIOLOGY CLINIC | Age: 80
End: 2020-01-20

## 2020-01-27 ENCOUNTER — TELEPHONE (OUTPATIENT)
Dept: CARDIOLOGY CLINIC | Age: 80
End: 2020-01-27

## 2020-01-27 NOTE — TELEPHONE ENCOUNTER
CALLED THE PATIENT AND SPOKE WITH HER SHE STATED THAT SHE IS ON VACATION AND OUT OF THE STATE RIGHT NOW AND SHE WILL GIVE US A CALL WHEN SHE IS BACK AND SHE WILL SCHEDULE HER CARDIAC STRESS TEST THEN .    CLOSING THE ORDER AND PLACING IT INTO ONE NOTE

## 2020-03-10 ENCOUNTER — PROCEDURE VISIT (OUTPATIENT)
Dept: CARDIOLOGY CLINIC | Age: 80
End: 2020-03-10
Payer: MEDICARE

## 2020-03-10 VITALS
DIASTOLIC BLOOD PRESSURE: 60 MMHG | HEIGHT: 60 IN | HEART RATE: 71 BPM | WEIGHT: 151 LBS | BODY MASS INDEX: 29.64 KG/M2 | SYSTOLIC BLOOD PRESSURE: 132 MMHG

## 2020-03-10 PROCEDURE — 93015 CV STRESS TEST SUPVJ I&R: CPT | Performed by: INTERNAL MEDICINE

## 2020-11-30 ENCOUNTER — TELEPHONE (OUTPATIENT)
Dept: CARDIOLOGY CLINIC | Age: 80
End: 2020-11-30

## 2020-11-30 NOTE — TELEPHONE ENCOUNTER
Cardiologist: Dr. Cuca Grullon  Surgeon: Dr. Chari Martinez  Surgery: Rt total knee arthroplasty  Anesthesia: Spinal  Date: TBD  FAX# [phone]  Ph# [phone]    Last OV 1/16/2020 w/ Ton         - LBBB; ETT  New LBBB,  ETT  For chrono incompetence     - VHD  SP  TAVR  Echo reviewed     - For Sleep study     -  Hypertension: Patients blood pressure is normal. Patient is advised about low sodium diet. Present medical regimen will not be changed. -  LIPID MANAGEMENT:  Available lipid  lab data reviewed  and patient was given dietary advice. NCEP- ATP III guidelines reviewed with patient. -   Changes  in medicines made: No        Stress Test- 3/10/2020   Overall Impression:   Limited Exercise capacity. 5 METs work load. Physiological BP response to exercise. ETT non diagnostic due to baseline LBBB. Echo- 8/1/2019   Left ventricular systolic function is normal with an ejection fraction of   55-60%. Normally functioning bioprosthetic valve in aortic position with a mean   pressure of 7 mmHg. Mild to moderate tricuspid regurgitation. Right ventricular systolic pressure of 39 mmHg consistent with mild   pulmonary hypertension.    No evidence of pericardial effusion      Aspirin

## 2020-12-04 ENCOUNTER — OFFICE VISIT (OUTPATIENT)
Dept: CARDIOLOGY CLINIC | Age: 80
End: 2020-12-04
Payer: MEDICARE

## 2020-12-04 VITALS
WEIGHT: 148 LBS | DIASTOLIC BLOOD PRESSURE: 64 MMHG | HEART RATE: 77 BPM | BODY MASS INDEX: 27.94 KG/M2 | SYSTOLIC BLOOD PRESSURE: 112 MMHG | HEIGHT: 61 IN

## 2020-12-04 PROCEDURE — 4040F PNEUMOC VAC/ADMIN/RCVD: CPT | Performed by: INTERNAL MEDICINE

## 2020-12-04 PROCEDURE — 1123F ACP DISCUSS/DSCN MKR DOCD: CPT | Performed by: INTERNAL MEDICINE

## 2020-12-04 PROCEDURE — 1090F PRES/ABSN URINE INCON ASSESS: CPT | Performed by: INTERNAL MEDICINE

## 2020-12-04 PROCEDURE — G8484 FLU IMMUNIZE NO ADMIN: HCPCS | Performed by: INTERNAL MEDICINE

## 2020-12-04 PROCEDURE — 1036F TOBACCO NON-USER: CPT | Performed by: INTERNAL MEDICINE

## 2020-12-04 PROCEDURE — 93000 ELECTROCARDIOGRAM COMPLETE: CPT | Performed by: INTERNAL MEDICINE

## 2020-12-04 PROCEDURE — G8400 PT W/DXA NO RESULTS DOC: HCPCS | Performed by: INTERNAL MEDICINE

## 2020-12-04 PROCEDURE — G8427 DOCREV CUR MEDS BY ELIG CLIN: HCPCS | Performed by: INTERNAL MEDICINE

## 2020-12-04 PROCEDURE — 99214 OFFICE O/P EST MOD 30 MIN: CPT | Performed by: INTERNAL MEDICINE

## 2020-12-04 PROCEDURE — G8417 CALC BMI ABV UP PARAM F/U: HCPCS | Performed by: INTERNAL MEDICINE

## 2020-12-04 RX ORDER — ASCORBIC ACID 500 MG
500 TABLET ORAL DAILY
COMMUNITY

## 2020-12-04 RX ORDER — SIMVASTATIN 40 MG
40 TABLET ORAL NIGHTLY
Qty: 90 TABLET | Refills: 3 | Status: SHIPPED | OUTPATIENT
Start: 2020-12-04 | End: 2021-08-20

## 2020-12-04 NOTE — LETTER
Cindy Tong  1940  [id number]    Have you had any Chest Pain that is not new? - No  If Yes DO EKG - How does it feel -    How long does the pain last -      How long have you been having the pain -    Did you take a    And did it relieve the pain -      DO EKG IF: Patient has a Heart Rate above 100 or below 40     CAD (Coronary Artery Disease) patient should have one on file every 6 months        Have you had any Shortness of Breath - Yes  If Yes - When on exertion    Have you had any dizziness -no  If Yes DO ORTHOSTATIC BP - when do you feel dizzy   How long does it last .        Sitting wait 5 minutes do supine (laying down) wait 5 minutes then do standing - log each in \"vitals\" area in Epic   Be sure to ask what symptoms they are having if they get dizzy while completing ortho stats such as room spinning, nausea, etc.    Have you had any palpitations that are not new? - No  If Yes DO EKG - Do you feel your heart   How long does it last - . Is the patient on any of the following medications -   f Yes DO EKG - Needs done every 6 months    Do you have any edema - swelling in No    If Yes - CHECK TO SEE IF THE EDEMA IS PITTING  How long have they been having edema -   If Yes - Have they worn compression stockings     Vein \"LEG PROBLEM Questionnaire\"  1. Do you have prominent leg veins? 2. Do you have any skin discoloration? 3. Do you have any healed or active sores? 4. Do you have swelling of the legs? 5. Do you have a family history of varicose veins? 6. Does your profession involve pro-longed        standing or heavy lifting? 7. Have you been fighting overweight problems? 8. Do you have restless legs? 9. Do you have any night time cramps?     10. Do you have any of the following in your legs:             Do you have a surgery or procedure scheduled in the near future - Yes  If Yes- DO EKG  If Yes - Who is the surgery with?  Dr Gricelda Conklin  Phone number of physician   Fax number of physician   Type of surgery   GIVE THIS INFORMATION TO DANIEL DUKES     Ask patient if they want to sign up for MyChart if they are not already signed up     Check to see if we have an E-MAIL on file for the patient     Check medication list thoroughly!!! AND RECONCILE OUTSIDE MEDICATIONS  If dose has changed change the entire order not just the MG  BE SURE TO ASK PATIENT IF [address] check out add to every patient's \"wrap up\" the following dot phrase AFTERHOURSEDUCATION and ensure we explain this to our patients

## 2020-12-04 NOTE — PATIENT INSTRUCTIONS
Please be informed that if you contact our office outside of normal business hours the physician on call cannot help with any scheduling or rescheduling issues, procedure instruction questions or any type of medication issue. We advise you for any urgent/emergency that you go to the nearest emergency room! PLEASE CALL OUR OFFICE DURING NORMAL BUSINESS HOURS    Monday - Friday   8 am to 5 pm    [facility]: [phone]    [place]:  [phone]    Please hold on to these instructions the  will call you within 1-9 business days when we receive authorization from your insurance. Echocardiogram    WHAT TO EXPECT:   ? This test will take approximately 45 minutes. ? It is an ultrasound of the heart. ? It can look at the valves and chambers inside the heart   ? There is no special instructions for this test.     If you are unable to keep this appointment, please notify us 24 hours prior to test at [phone]. Please hold on to these instructions the  will call you within 1-9 business days when we receive authorization from your insurance. Nuclear Stress Test    WHAT TO EXPECT:   ? You will need to confirm the test or it could be cancelled. ? This test will take approximately 2 hours: 1 hour in the AM &    1 hour in the PM. You will be given a time by the Technologist after the first part is completed to come back. ? You will be given a medication, through an IV in the hand, this will safely simulate exercise. This IV is also needed to inject the radioactive isotope unless you are able toe walk the treadmill. ? You will receive an injection in the AM & PM before the pictures. ?  Using a special camera, you will have one set of pictures of your heart taken in the AM and a set of pictures in the PM.     PREPARATION FOR TEST:  ? Eat a light breakfast such as water or juice and toast.  ? If you are DIABETIC: Eat a normal breakfast with NO CAFFEINE and take your insulin as normal.   ? AVOID ALL FOODS & DRINKS containing CAFFEINE 12 HOURS PRIOR TO THE TEST: Including coffee, Tea, Jefe and other soft drinks even those labeled  caffeine free or decaffeinated.   HOLD THESE MEDICATIONS The physician will specify if the following Beta blockers need to be held for 24 hours prior to test: Toprol XL (metoprolol ER)

## 2020-12-04 NOTE — PROGRESS NOTES
CARDIOLOGY NOTE      12/5/2020    RE: Marion Gan  (1940)                               TO:  Dr. Maki Valles, APRN - CNP            Karena Hernandez is a 78 y.o. female who was seen today for management of  TAVR                                    HPI:                   The patient does have cardiac complaints of increasing fatigue and has mild exertional  SOB as well for  afew days. Patient also seen  for    - VHD  SP  TAVR stable  - Hypertension,is  well controlled, pt is  compliant with medicines  - Hyperlipidimea, importance of hyperlipidimea discussed with pt. Marion Gan has the following history recorded in care path:  Patient Active Problem List    Diagnosis Date Noted    Aortic stenosis 08/28/2017     Priority: Low    SOBOE (shortness of breath on exertion) 08/28/2017     Priority: Low    Fatigue     Dizziness 06/26/2018     Current Outpatient Medications   Medication Sig Dispense Refill    vitamin C (ASCORBIC ACID) 500 MG tablet Take 500 mg by mouth daily      vitamin D 25 MCG (1000 UT) CAPS Take by mouth      simvastatin (ZOCOR) 40 MG tablet Take 1 tablet by mouth nightly 90 tablet 3    metoprolol tartrate (LOPRESSOR) 25 MG tablet Take 0.5 tablets by mouth 2 times daily 90 tablet 3    aspirin 81 MG tablet Take 81 mg by mouth daily      melatonin 3 MG TABS tablet Take 3 mg by mouth daily      oxybutynin (DITROPAN) 5 MG tablet Take 5 mg by mouth daily      Multiple Vitamins-Minerals (THERAPEUTIC MULTIVITAMIN-MINERALS) tablet Take 1 tablet by mouth daily      CALCIUM PO Take  by mouth daily.  GLUCOSAMINE-CHONDROITIN PO Take  by mouth daily.  Cyanocobalamin (VITAMIN B-12 PO) Take 2,500 mg by mouth daily       FLUoxetine (PROZAC) 10 MG tablet Take 10 mg by mouth daily. No current facility-administered medications for this visit.       Allergies: Tape Avie Letters tape]  Past Medical History:   Diagnosis Date    Chest discomfort     Fatigue     H/O 24 hour EKG monitoring 1/6/2009 1/6/2009 - Baseline rhythm mechanism is normal sinus. Breif episodes of SVT & sinus tachycardia noted. No clinically significant arrhythmias seen. No untoward symptoms charted in patient's diary.  H/O cardiovascular stress test 1/6/2009 1/6/2009 - Normal pattern of perfusion in all regions. LV is normal in size. No wall motion abnormality seen. LVSF is normal. Exercise capcity 8 METS. Exercise capacity is normal. No ECG changes.  H/O Doppler ultrasound 07/10/2018    carotid - bilateral mild disease    H/O echocardiogram 4/24/2012, 1/5/2010 4/24/2012 - LVSF is normal. EF = >55%. Impaired LV relaxation. Mild TR, pulmonary HTN, aortic valve calcification & valvular aortic stenosis. Luis Carlos Acevedo H/O echocardiogram 08/14/2017    EF55-60% mod-severe AS, mild AR, mild-mod MR    H/O echocardiogram 08/01/2019    EF 55-60%, normally functioning bioprosthetic valve in aortic position, Mild-Mod TR, Mild Pulm HTN    History of blood transfusion     History of exercise stress test 03/10/2020    Treadmill, Physiological BP response to exercise. ETT non diagnostic due to baseline LBBB.  Hx of cardiovascular stress test 08/30/2017    EF 60% normal study    Hx of Doppler echocardiogram 07/06/2018    EF 55-60%  mod AR, mild MR, mild to mod TR, and mild pulmonic regurg. Mild pulmonary htn.    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    [facility]    HYSTERECTOMY      OTHER SURGICAL HISTORY Left     Ear surgery      As reviewed   Family History   Problem Relation Age of Onset    Other Father         appendix ruptured    Heart Failure Mother         CHF   [facility] Hypertension Mother     Cancer Sister     Atrial Fibrillation Sister     Atrial Fibrillation Sister     Atrial Fibrillation Sister      Social History     Tobacco Use    Smoking status: Never Smoker    Smokeless tobacco: Never Used   Substance Use Topics    Alcohol use: No     Comment: CAFFEINE: 3 cups daily      Review of Systems:    Constitutional: Negative for diaphoresis and fatigue  Psychological:Negative for anxiety or depression  HENT: Negative for headaches, nasal congestion, sinus pain or vertigo  Eyes: Negative for visual disturbance. Endocrine: Negative for polydipsia/polyuria  Respiratory: Negative for shortness of breath  Cardiovascular:  SOB  Gastrointestinal: Negative for abdominal pain or heartburn  Genito-Urinary: Negative for urinary frequency/urgency  Musculoskeletal: Negative for muscle pain, muscular weakness, negative for pain in arm and leg or swelling in foot and leg  Neurological: Negative for dizziness, headaches, memory loss, numbness/tingling, visual changes, syncope  Dermatological: Negative for rash    Objective:    Vitals:    12/04/20 0938   BP: 112/64   Pulse: 77   Weight: 148 lb (67.1 kg)   Height: 5' 1\" (1.549 m)     /64   Pulse 77   Ht 5' 1\" (1.549 m)   Wt 148 lb (67.1 kg)   BMI 27.96 kg/m²     No flowsheet data found. Wt Readings from Last 3 Encounters:   12/04/20 148 lb (67.1 kg)   03/10/20 151 lb (68.5 kg)   01/16/20 151 lb (68.5 kg)     Body mass index is 27.96 kg/m². GENERAL - Alert, oriented, pleasant, in no apparent distress. EYES: No jaundice, no conjunctival pallor. SKIN: It is warm & dry. No rashes. No Echhymosis    HEENT - No clinically significant abnormalities seen. Neck - Supple. No jugular venous distention noted. No carotid bruits. Cardiovascular - Normal S1 and S2 without obvious murmur or gallop. Extremities - No cyanosis, clubbing, or significant edema. Pulmonary - No respiratory distress. No wheezes or rales.     Abdomen - No masses, tenderness, or organomegaly. Musculoskeletal - No significant edema. No joint deformities. No muscle wasting. Neurologic - Cranial nerves II through XII are grossly intact. There were no gross focal neurologic abnormalities. Lab Review   No results found for: CKTOTAL, CKMB, CKMBINDEX, TROPONINT  BNP:  No results found for: BNP  PT/INR:  No results found for: INR  Lab Results   Component Value Date    LABA1C 5.7 08/20/2018     Lab Results   Component Value Date    WBC 6.11 12/12/2018    HCT 38.3 12/12/2018    MCV 94.5 08/20/2018     12/12/2018     Lab Results   Component Value Date    CHOL 167 08/20/2018    TRIG 108 08/20/2018    HDL 52 08/20/2018    LDLCALC 98 08/02/2010    LDLDIRECT 114 (H) 08/20/2018     Lab Results   Component Value Date    ALT 18 11/07/2018    AST 22 11/07/2018     BMP:    Lab Results   Component Value Date     12/12/2018    K 3.8 12/12/2018     12/12/2018    CO2 25 12/12/2018    BUN 16 12/12/2018    CREATININE 0.66 12/12/2018     CMP:   Lab Results   Component Value Date     12/12/2018    K 3.8 12/12/2018     12/12/2018    CO2 25 12/12/2018    BUN 16 12/12/2018    PROT 6.1 08/20/2018     TSH:    Lab Results   Component Value Date    TSHHS 1.510 08/20/2018         Impression:    1. Pre-op evaluation    2. Abnormal EKG       Patient Active Problem List   Diagnosis Code    Aortic stenosis I35.0    SOBOE (shortness of breath on exertion) R06.02    Dizziness R42    Fatigue R53.83       Assessment & Plan:    - SOB; echo and lidia, new symptoms    - VHD  SP  TAVR    -  Hypertension: Patients blood pressure is normal. Patient is advised about low sodium diet. Present medical regimen will not be changed. -  LIPID MANAGEMENT:  Importance of lipid levels discussed with patient   and patient was given dietary advice. NCEP- ATP III guidelines reviewed with patient. -   Changes  in medicines made:  China Loco Yesica Jimenez MD    [facility]

## 2020-12-11 ENCOUNTER — OFFICE VISIT (OUTPATIENT)
Dept: PHYSICAL MEDICINE AND REHAB | Age: 80
End: 2020-12-11
Payer: MEDICARE

## 2020-12-11 VITALS — TEMPERATURE: 97.1 F

## 2020-12-11 PROCEDURE — 95910 NRV CNDJ TEST 7-8 STUDIES: CPT | Performed by: PHYSICAL MEDICINE & REHABILITATION

## 2020-12-11 PROCEDURE — 95886 MUSC TEST DONE W/N TEST COMP: CPT | Performed by: PHYSICAL MEDICINE & REHABILITATION

## 2020-12-11 NOTE — LETTER
December 11, 2020        Skyler Young MD  [address] [place] [address],[address]    [address] [address]          Patient Name: Kristi Suarez   MRN Number: [id number]   YOB: 1940   Date Of Visit: 12/11/2020        Dear Skyler Young MD,       Thank you for referring Kristi Suarez to me for electrodiagnostic testing. Attached are the findings of the EMG and my assessment. If you have any further questions, please do not hesitate to call me. Thank you for this interesting referral.      Sincerely,          GABE Barboza MD.

## 2020-12-11 NOTE — PROGRESS NOTES
EMG REPORT     CHIEF COMPLAINT: Painful burning and tingling in both hands. HISTORY OF PRESENT ILLNESS: 78 y.o. right hand dominant female with recent worsening of hand numbness and tingling which is painful. The pain does radiate from the long fingers and her forearms towards her elbows. She rated the right worse than the left. She rated the pain intensity as 7/10. She has not noted any rashes or color changes. She has arthritic changes in the fingers but no new swelling. She has not had any recent change in activities or injuries. She does walk with a cane. She had a remote episode of cervical pain associated with stenosis that was helped with therapy. She has no history of diabetes or any thyroid disorder. PHYSICAL EXAMINATION: Alert. Only about 35 degrees of active cervical spine rotation bilaterally. Spurling's maneuver was negative. Upper limb reflexes were trace and symmetric. Tinel's sign was negative. She has multiple joint deformities in both hands. Thumb opposition strength testing was 3+/5 on the left and 4+/5 on the right. All other strength seem normal.  Sensation was blunted in the long fingers and thumbs of both hands. The left thenar eminence was flat. No tremor or clonus was noted. NERVE CONDUCTION STUDIES:     MOTOR         LATENCY NORMAL AMPLITUDE DISTANCE COND. ESTER.    RIGHT  MEDIAN 5.6 < 4.2 msec 5.2 8 cm 58   LEFT  MEDIAN 5.7 < 4.2 msec 0.3 8 cm 43   RIGHT  ULNAR 2.7 < 4.2 msec 7.9/6.3 8 cm 55/42   LEFT  ULNAR 2.9 < 4.2 msec 6.4 8 cm >50      SENSORY  ORTHODROMIC        LATENCY NORMAL AMPLITUDE DISTANCE   RIGHT MEDIAN 3.9 <2.3 msec 9 10 cm   LEFT  MEDIAN Absent < 2.3 msec  10 cm   RIGHT  ULNAR 2.3 < 2.3 msec 9 10 cm   LEFT  ULNAR 2.6 < 2.3 msec 10 10 cm         NEEDLE EMG:      RIGHT   LEFT     Insertional Activity Spontaneous  Activity Volutional  MUAP's Insertional Activity Spontaneous  Activity Volutional  MUAP's   Cerv Parasp Normal None Normal Normal None Normal   Infraspinatus Normal None Dec #, Larger Normal None Normal   Deltoid   Normal None Dec #, Larger Normal None Normal   Biceps   Normal None Dec #, Larger Normal None Normal   Triceps   Normal None Normal Normal None Normal   Pronator Teres Normal None Dec #, Larger Normal None Normal   Extensor Indicis Normal None Normal Normal None Normal   1st Dorsal Interosseus Normal None Normal Normal None Normal   ADM Normal None Dec #, Larger Normal None Normal   Abductor Pollicis Br. Increased Occ  Dec #, Larger Increased + Dec #, Larger, Polys       FINDINGS:   EMG of the cervical paraspinals and both upper limbs demonstrated no proximal muscle membrane irritability. Distinctly neuropathic motor units were identified throughout the right C6 myotome. A few positive waves, fibrillations and neuropathic motor units were noted in the APB muscles of each hand, more so on the left. Mild motor unit remodeling was noted in the ulnar innervated muscles of the right hand. Median nerve conductions were normal bilaterally. There was no left median sensory response identified and the median motor evoked amplitude was markedly diminished. The forearm conduction velocity was slowed as well. The right median sensory and motor latencies were delayed but the evoked amplitudes were better preserved. Ulnar sensory conductions were reasonably normal while ulnar motor conduction compromise was noted about the right elbow. IMPRESSION:      1. Abnormal EMG. 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 2.  Mild to moderately severe right CTS. 3.  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 4.  There is significant motor unit remodeling throughout the right C6 myotome is evidence of a past right C6 nerve root injury (remote right C6 radiculopathy).      5.  No evidence of a concurrent cervical plexopathy or primary muscle disease.        Thank you for this interesting referral.

## 2020-12-22 ENCOUNTER — PROCEDURE VISIT (OUTPATIENT)
Dept: CARDIOLOGY CLINIC | Age: 80
End: 2020-12-22
Payer: MEDICARE

## 2020-12-22 LAB
LV EF: 58 %
LV EF: 75 %
LVEF MODALITY: NORMAL
LVEF MODALITY: NORMAL

## 2020-12-22 PROCEDURE — 93306 TTE W/DOPPLER COMPLETE: CPT | Performed by: INTERNAL MEDICINE

## 2020-12-22 PROCEDURE — 93015 CV STRESS TEST SUPVJ I&R: CPT | Performed by: INTERNAL MEDICINE

## 2020-12-22 PROCEDURE — 78452 HT MUSCLE IMAGE SPECT MULT: CPT | Performed by: INTERNAL MEDICINE

## 2020-12-22 PROCEDURE — A9500 TC99M SESTAMIBI: HCPCS | Performed by: INTERNAL MEDICINE

## 2020-12-23 ENCOUNTER — TELEPHONE (OUTPATIENT)
Dept: CARDIOLOGY CLINIC | Age: 80
End: 2020-12-23

## 2020-12-23 NOTE — TELEPHONE ENCOUNTER
Patient notified of results. Appointment made to discuss 12/28 @ 130 w/ Dr. Lanette Wiley. Conclusions       [facility] physician Dr. Skye Lemus sized defect of moderate severity which is reversible involving    anterolateral wall of myocardium suggestive of ischemia    Normal EF 75 % with normal ventricular contractility.        Recommendation    Abnormal Stress Test with anteriolateral wall ischemia    Follow up as out patient to discuss results and future plan of action.        Signatures       Conclusions      Summary   Left ventricular systolic function is normal with an ejection fraction of   55-60%. Normally functioning bioprosthetic valve in aortic position. Mild-to-moderate tricuspid regurgitation. Right ventricular systolic pressure of 43 mmHg consistent with mild   pulmonary hypertension. No evidence of pericardial effusion.       Signature

## 2020-12-28 ENCOUNTER — TELEPHONE (OUTPATIENT)
Dept: CARDIOLOGY CLINIC | Age: 80
End: 2020-12-28

## 2020-12-28 ENCOUNTER — NURSE ONLY (OUTPATIENT)
Dept: CARDIOLOGY CLINIC | Age: 80
End: 2020-12-28

## 2020-12-28 ENCOUNTER — OFFICE VISIT (OUTPATIENT)
Dept: CARDIOLOGY CLINIC | Age: 80
End: 2020-12-28
Payer: MEDICARE

## 2020-12-28 ENCOUNTER — HOSPITAL ENCOUNTER (OUTPATIENT)
Age: 80
Discharge: HOME | End: 2020-12-28
Payer: MEDICARE

## 2020-12-28 VITALS
BODY MASS INDEX: 29.02 KG/M2 | SYSTOLIC BLOOD PRESSURE: 142 MMHG | WEIGHT: 147.8 LBS | HEART RATE: 60 BPM | DIASTOLIC BLOOD PRESSURE: 88 MMHG | HEIGHT: 60 IN

## 2020-12-28 LAB — TSH HIGH SENSITIVITY: 1.68 UIU/ML (ref 0.27–4.2)

## 2020-12-28 PROCEDURE — 1090F PRES/ABSN URINE INCON ASSESS: CPT | Performed by: INTERNAL MEDICINE

## 2020-12-28 PROCEDURE — 1123F ACP DISCUSS/DSCN MKR DOCD: CPT | Performed by: INTERNAL MEDICINE

## 2020-12-28 PROCEDURE — 36415 COLL VENOUS BLD VENIPUNCTURE: CPT

## 2020-12-28 PROCEDURE — 84443 ASSAY THYROID STIM HORMONE: CPT

## 2020-12-28 PROCEDURE — G8427 DOCREV CUR MEDS BY ELIG CLIN: HCPCS | Performed by: INTERNAL MEDICINE

## 2020-12-28 PROCEDURE — G8484 FLU IMMUNIZE NO ADMIN: HCPCS | Performed by: INTERNAL MEDICINE

## 2020-12-28 PROCEDURE — G8400 PT W/DXA NO RESULTS DOC: HCPCS | Performed by: INTERNAL MEDICINE

## 2020-12-28 PROCEDURE — G8417 CALC BMI ABV UP PARAM F/U: HCPCS | Performed by: INTERNAL MEDICINE

## 2020-12-28 PROCEDURE — 1036F TOBACCO NON-USER: CPT | Performed by: INTERNAL MEDICINE

## 2020-12-28 PROCEDURE — 4040F PNEUMOC VAC/ADMIN/RCVD: CPT | Performed by: INTERNAL MEDICINE

## 2020-12-28 PROCEDURE — 99214 OFFICE O/P EST MOD 30 MIN: CPT | Performed by: INTERNAL MEDICINE

## 2020-12-28 RX ORDER — ACETAMINOPHEN 325 MG/1
650 TABLET ORAL EVERY 6 HOURS PRN
COMMUNITY

## 2020-12-28 NOTE — TELEPHONE ENCOUNTER
Patient wants to know what she can take for her knee pain while she awaits her knee replacement? She previously had a TAVR. Please call her back at ph# [phone].

## 2020-12-28 NOTE — LETTER
Stuart Crock Dr. Guadalupe Fleischer E Brouhard  1940  [id number]    Have you had any Chest Pain that is not new? - No \      Have you had any Shortness of Breath - Yes  If Yes - When on exertion    Have you had any dizziness - No       Have you had any palpitations that are not new? - No       Do you have any edema - swelling in NONE        Do you have a surgery or procedure scheduled in the near future - Yes  If Yes- DO EKG  If Yes - Who is the surgery with? Dr. Shanthi Graham  Phone number of physician     Fax number of physician     Type of surgery   Right knee replacement, not yet scheduled  GIVE THIS INFORMATION TO DANIEL DUKES    ? Ask patient if they want to sign up for [facility] if they are not already signed up    ? Check to see if we have an E-MAIL on file for the patient    ? Check medication list thoroughly!!! AND RECONCILE OUTSIDE MEDICATIONS  If dose has changed change the entire order not just the MG  BE SURE TO ASK PATIENT IF THEY NEED MEDICATION REFILLS    ?  At check out add to every patient's \"wrap up\" the following dot phrase AFTERHOURSEDUCATION and ensure we explain this to our patients

## 2020-12-28 NOTE — PROGRESS NOTES
CARDIOLOGY NOTE      12/28/2020    RE: Rafael Steele  (1940)                               TO:  Dr. Alejandro Weldon, APRN - CNP            Ag Roman is a 78 y.o. female who was seen today for management of  VHD                    Here for abn stress                HPI:                   The patient does  have cardiac complaints of feeling exhausted and fatigued no SOB or CP. Patient also seen  for    -   - VHD  SP  TAVR  - Hypertension,is  well controlled, pt is  compliant with medicines'  - Hyperlipidimea, importance of hyperlipidimea discussed with pt. Rafael Steele has the following history recorded in care path:  Patient Active Problem List    Diagnosis Date Noted    Aortic stenosis 08/28/2017     Priority: Low    SOBOE (shortness of breath on exertion) 08/28/2017     Priority: Low    Fatigue     Dizziness 06/26/2018     Current Outpatient Medications   Medication Sig Dispense Refill    acetaminophen (TYLENOL) 325 MG tablet Take 650 mg by mouth every 6 hours as needed for Pain      vitamin C (ASCORBIC ACID) 500 MG tablet Take 500 mg by mouth daily      vitamin D 25 MCG (1000 UT) CAPS Take by mouth      simvastatin (ZOCOR) 40 MG tablet Take 1 tablet by mouth nightly 90 tablet 3    metoprolol tartrate (LOPRESSOR) 25 MG tablet Take 0.5 tablets by mouth 2 times daily 90 tablet 3    aspirin 81 MG tablet Take 81 mg by mouth daily      melatonin 3 MG TABS tablet Take 3 mg by mouth daily      oxybutynin (DITROPAN) 5 MG tablet Take 5 mg by mouth 2 times daily       Multiple Vitamins-Minerals (THERAPEUTIC MULTIVITAMIN-MINERALS) tablet Take 1 tablet by mouth daily      CALCIUM PO Take  by mouth daily.  GLUCOSAMINE-CHONDROITIN PO Take  by mouth daily.  Cyanocobalamin (VITAMIN B-12 PO) Take 2,500 mg by mouth daily       FLUoxetine (PROZAC) 10 MG tablet Take 10 mg by mouth daily. No current facility-administered medications for this visit. Allergies: Tape [adhesive tape]  Past Medical History:   Diagnosis Date    Chest discomfort     Fatigue     H/O 24 hour EKG monitoring 01/06/2009 1/6/2009 - Baseline rhythm mechanism is normal sinus. Breif episodes of SVT & sinus tachycardia noted. No clinically significant arrhythmias seen. No untoward symptoms charted in patient's diary.  H/O cardiovascular stress test 01/06/2009 1/6/2009 - Normal pattern of perfusion in all regions. LV is normal in size. No wall motion abnormality seen. LVSF is normal. Exercise capcity 8 METS. Exercise capacity is normal. No ECG changes.  H/O cardiovascular stress test 12/21/2020    EF 75% abnormal stress test    H/O Doppler ultrasound 07/10/2018    carotid - bilateral mild disease    H/O echocardiogram 4/24/2012, 1/5/2010 4/24/2012 - LVSF is normal. EF = >55%. Impaired LV relaxation. Mild TR, pulmonary HTN, aortic valve calcification & valvular aortic stenosis. Brittani Grajeda H/O echocardiogram 08/14/2017    EF55-60% mod-severe AS, mild AR, mild-mod MR    H/O echocardiogram 08/01/2019    EF 55-60%, normally functioning bioprosthetic valve in aortic position, Mild-Mod TR, Mild Pulm HTN    H/O echocardiogram 12/21/2020    EF 55-60% normally functioning bioprostethic valve mild to moderate tricuspid regurgitation mild pulmonary htn no evidence of pericardial effusion.  History of blood transfusion     History of exercise stress test 03/10/2020    Treadmill, Physiological BP response to exercise. ETT non diagnostic due to baseline LBBB.  Hx of cardiovascular stress test 08/30/2017    EF 60% normal study    Hx of Doppler echocardiogram 07/06/2018    EF 55-60%  mod AR, mild MR, mild to mod TR, and mild pulmonic regurg. Mild pulmonary htn.     Hyperlipemia     Hypertension     Malaise and fatigue     Osteoarthritis     Restless leg     S/P TAVR (transcatheter aortic valve replacement) 09/24/2018    [place]    Sleep disorder     Spinal stenosis     VHD (valvular heart disease)      Past Surgical History:   Procedure Laterality Date    [place]    HYSTERECTOMY      OTHER SURGICAL HISTORY Left     Ear surgery      As reviewed   Family History   Problem Relation Age of Onset    Other Father         appendix ruptured    Heart Failure Mother         CHF    Hypertension Mother     Cancer Sister     Atrial Fibrillation Sister     Atrial Fibrillation Sister     Atrial Fibrillation Sister      Social History     Tobacco Use    Smoking status: Never Smoker    Smokeless tobacco: Never Used   Substance Use Topics    Alcohol use: No     Comment: CAFFEINE: 3 cups daily      Review of Systems:    Constitutional:  fatigue  Psychological:Negative for anxiety or depression  HENT: Negative for headaches, nasal congestion, sinus pain or vertigo  Eyes: Negative for visual disturbance.    Endocrine: Negative for polydipsia/polyuria  Respiratory: Negative for shortness of breath  Cardiovascular: Negative for chest pain, dyspnea on exertion, claudication, edema, irregular heartbeat, murmur, palpitations or shortness of breath  Gastrointestinal: Negative for abdominal pain or heartburn  Genito-Urinary: Negative for urinary frequency/urgency  Musculoskeletal: Negative for muscle pain, muscular weakness, negative for pain in arm and leg or swelling in foot and leg  Neurological: Negative for dizziness, headaches, memory loss, numbness/tingling, visual changes, syncope  Dermatological: Negative for rash    Objective:    Vitals:    12/28/20 1323 12/28/20 1328   BP: (!) 146/88 (!) 142/88   Site: Left Upper Arm Left Upper Arm   Position: Sitting Sitting   Cuff Size: Medium Adult Medium Adult   Pulse: 60    Weight: 147 lb 12.8 oz (67 kg)    Height: 5' (1.524 m)      BP (!) 142/88 (Site: Left Upper Arm, Position: Sitting, Cuff Size: Medium Adult)   Pulse 60   Ht 5' (1.524 m)   Wt 147 lb 12.8 oz (67 kg)   BMI 28.87 kg/m²     No flowsheet data found. Wt Readings from Last 3 Encounters:   12/28/20 147 lb 12.8 oz (67 kg)   12/04/20 148 lb (67.1 kg)   03/10/20 151 lb (68.5 kg)     Body mass index is 28.87 kg/m². GENERAL - Alert, oriented, pleasant, in no apparent distress. EYES: No jaundice, no conjunctival pallor. SKIN: It is warm & dry. No rashes. No Echhymosis    HEENT  No clinically significant abnormalities seen. Neck - Supple. No jugular venous distention noted. No carotid bruits. Cardiovascular  Normal S1 and S2 without obvious murmur or gallop. Extremities - No cyanosis, clubbing, or significant edema. Pulmonary  No respiratory distress. No wheezes or rales. Abdomen  No masses, tenderness, or organomegaly. Musculoskeletal  No significant edema. No joint deformities. No muscle wasting. Neurologic  Cranial nerves II through XII are grossly intact. There were no gross focal neurologic abnormalities.     Lab Review   No results found for: CKTOTAL, CKMB, CKMBINDEX, TROPONINT  BNP:  No results found for: BNP  PT/INR:  No results found for: INR  Lab Results   Component Value Date    LABA1C 5.7 08/20/2018     Lab Results   Component Value Date    WBC 6.11 12/12/2018    HCT 38.3 12/12/2018    MCV 94.5 08/20/2018     12/12/2018     Lab Results   Component Value Date    CHOL 181 01/14/2020    TRIG 135 01/14/2020    HDL 64 01/14/2020    LDLCALC 90 01/14/2020    LDLDIRECT 114 (H) 08/20/2018     Lab Results   Component Value Date    ALT 18 11/07/2018    AST 22 11/07/2018     BMP:    Lab Results   Component Value Date     12/12/2018    K 3.8 12/12/2018     12/12/2018    CO2 25 12/12/2018    BUN 16 12/12/2018    CREATININE 0.66 12/12/2018     CMP:   Lab Results   Component Value Date     12/12/2018    K 3.8 12/12/2018     12/12/2018    CO2 25 12/12/2018    BUN 16 12/12/2018    PROT 6.1 08/20/2018     TSH:    Lab Results   Component Value Date    TSHHS 1.510 08/20/2018         Impression: 1. Other hyperlipidemia       Patient Active Problem List   Diagnosis Code    Aortic stenosis I35.0    SOBOE (shortness of breath on exertion) R06.02    Dizziness R42    Fatigue R53.83       Assessment & Plan:    - CTA coronary DW pts ork at [facility]    - [facility]    -  Hypertension: Patients blood pressure is normal. Patient is advised about low sodium diet. Present medical regimen will not be changed. [facility] 18  CORONARY_ANGIO/FINDINGS  No angiographic evidence of plaque in the LMCA, LAD, Circumflex or RCA  systems. LMCA within normal limits  LAD within normal limits  CIRC within normal limits  RCA within normal limits        - fatigue ? chrono incompetence, has LBBB and maribell         -  LIPID MANAGEMENT:  Importance of lipid levels discussed with patient   and patient was given dietary advice. NCEP- ATP III guidelines reviewed with patient. -   Changes  in medicines made:  No                                - VHD  SP  TAVR  ECHO REV WITH PT        Luisa Lozano MD    [address] [facility]

## 2020-12-28 NOTE — PATIENT INSTRUCTIONS
Please be informed that if you contact our office outside of normal business hours the physician on call cannot help with any scheduling or rescheduling issues, procedure instruction questions or any type of medication issue. We advise you for any urgent/emergency that you go to the nearest emergency room!     PLEASE CALL OUR OFFICE DURING NORMAL BUSINESS HOURS    Monday - Friday   8 am to 5 pm    [place]: [phone]    [place]:  [phone]

## 2020-12-30 ENCOUNTER — TELEPHONE (OUTPATIENT)
Dept: CARDIOLOGY CLINIC | Age: 80
End: 2020-12-30

## 2020-12-30 NOTE — TELEPHONE ENCOUNTER
I spoke with the patient and she is going to be at her appointment for her CTA Cardiac that was ordered by  . 1/5/2021 arrival time 9:30 / scan time 10:00 am . NPO @ Midnight the night before .  This is going to be at [facility]

## 2020-12-31 DIAGNOSIS — R00.2 PALPITATIONS: ICD-10-CM

## 2021-01-05 ENCOUNTER — HOSPITAL ENCOUNTER (OUTPATIENT)
Dept: CT IMAGING | Age: 81
Discharge: HOME | End: 2021-01-05
Payer: MEDICARE

## 2021-01-05 VITALS — DIASTOLIC BLOOD PRESSURE: 69 MMHG | HEART RATE: 65 BPM | SYSTOLIC BLOOD PRESSURE: 131 MMHG

## 2021-01-05 DIAGNOSIS — R94.39 ABNORMAL CARDIOVASCULAR STRESS TEST: ICD-10-CM

## 2021-01-05 PROCEDURE — 75574 CT ANGIO HRT W/3D IMAGE: CPT

## 2021-01-05 PROCEDURE — 75574 CT ANGIO HRT W/3D IMAGE: CPT | Performed by: INTERNAL MEDICINE

## 2021-01-05 PROCEDURE — 6370000000 HC RX 637 (ALT 250 FOR IP): Performed by: INTERNAL MEDICINE

## 2021-01-05 PROCEDURE — 6360000004 HC RX CONTRAST MEDICATION: Performed by: INTERNAL MEDICINE

## 2021-01-05 RX ORDER — METOPROLOL TARTRATE 50 MG/1
50 TABLET, FILM COATED ORAL ONCE
Status: COMPLETED | OUTPATIENT
Start: 2021-01-05 | End: 2021-01-05

## 2021-01-05 RX ADMIN — METOPROLOL TARTRATE 50 MG: 50 TABLET, FILM COATED ORAL at 09:57

## 2021-01-05 RX ADMIN — IOPAMIDOL 75 ML: 755 INJECTION, SOLUTION INTRAVENOUS at 11:31

## 2021-01-29 ENCOUNTER — TELEMEDICINE (OUTPATIENT)
Dept: CARDIOLOGY CLINIC | Age: 81
End: 2021-01-29
Payer: MEDICARE

## 2021-01-29 DIAGNOSIS — R53.83 FATIGUE, UNSPECIFIED TYPE: Primary | ICD-10-CM

## 2021-01-29 PROCEDURE — 4040F PNEUMOC VAC/ADMIN/RCVD: CPT | Performed by: INTERNAL MEDICINE

## 2021-01-29 PROCEDURE — 99213 OFFICE O/P EST LOW 20 MIN: CPT | Performed by: INTERNAL MEDICINE

## 2021-01-29 PROCEDURE — G8427 DOCREV CUR MEDS BY ELIG CLIN: HCPCS | Performed by: INTERNAL MEDICINE

## 2021-01-29 PROCEDURE — 1090F PRES/ABSN URINE INCON ASSESS: CPT | Performed by: INTERNAL MEDICINE

## 2021-01-29 PROCEDURE — 1123F ACP DISCUSS/DSCN MKR DOCD: CPT | Performed by: INTERNAL MEDICINE

## 2021-01-29 PROCEDURE — G8400 PT W/DXA NO RESULTS DOC: HCPCS | Performed by: INTERNAL MEDICINE

## 2021-01-29 NOTE — LETTER
Dr. Antony Clemente  1940  [id number]    Have you had any Chest Pain that is not new? - No  Have you had any Shortness of Breath - No  Have you had any dizziness - No  Have you had any palpitations that are not new?  - No   Do you have any edema - swelling in No      Do you have a surgery or procedure scheduled in the near future - No

## 2021-01-29 NOTE — PROGRESS NOTES
Cirilo Mariscal  is a  Established patient  ,78 y.o.   female here for evaluation of the following chief complaint(s):    VHD    On video call        SUBJECTIVE/OBJECTIVE:  HPI : h/o  TAVR, htn, hyperlipidimea now for video call  Feels weak. Review of Systems    weakness    There were no vitals filed for this visit. There were no vitals taken for this visit. Patient-Reported Vitals 1/29/2021   Patient-Reported Weight 149lb   Patient-Reported Height 5ft   Patient-Reported Systolic 743   Patient-Reported Diastolic 71   Patient-Reported Pulse 75     Wt Readings from Last 3 Encounters:   12/28/20 147 lb 12.8 oz (67 kg)   12/04/20 148 lb (67.1 kg)   03/10/20 151 lb (68.5 kg)     There is no height or weight on file to calculate BMI. Physical Exam   alert     All pertinent data reviewed      Meds : reviewed         Tests ordered        ASSESSMENT/PLAN:    - VHD  Had TAVR    -  Hypertension: Patients blood pressure is normal. Patient is advised about low sodium diet. Present medical regimen will not be changed. -  LIPID MANAGEMENT:  Importance of lipid levels discussed with patient   and patient was given dietary advice. NCEP- ATP III guidelines reviewed with patient. -   Changes  in medicines made: No                         - Will review CTA  NO CAD    - Moderate cardiac risk for Knee surgery     I confirm that this visit was completed in a telehealth setting ,using synchronous audiovisual technology for real time patient interaction . The patient identity with name and date of birth was confirmed . This evaluation of patient was done by telehealth in the setting of [address] emergency , which precluded assurance of safe in person visit at the time of service. The patient consented to and accepts potential risks associated with telemedical evaluation and care was taken to assess kyra presence of any medical issues that would be more  appropriate for expedited in -person care.      Pursuant to the emergency declaration under the [facility], [facility] waiver authority and the [employer] Act, this Virtual  Visit was conducted, with patient's consent, to reduce the patient's risk of exposure to COVID-19 and provide continuity of care for an established patient. Services were provided through a video synchronous discussion virtually to substitute for in-person clinic visit. An electronic signature was used to authenticate this note.     --Toshia Donnelly MD

## 2021-02-11 ENCOUNTER — TELEPHONE (OUTPATIENT)
Dept: CARDIOLOGY CLINIC | Age: 81
End: 2021-02-11

## 2021-02-11 NOTE — TELEPHONE ENCOUNTER
Called patient to see if she mailed back the event monitor she wore because I have not gotten a final report. No answer and I left a message.

## 2021-05-25 ENCOUNTER — TELEPHONE (OUTPATIENT)
Dept: CARDIOLOGY CLINIC | Age: 81
End: 2021-05-25

## 2021-05-25 NOTE — TELEPHONE ENCOUNTER
Pt called and states she had a knee replacement, since her Taver was told not to take  anything  but Tylenol. States it is not working. Can she take advil and how much and how offten please advise.

## 2021-05-25 NOTE — TELEPHONE ENCOUNTER
Per Arlene Ramirez NP: Short term use of motrin 200-400 mg every 6-8 hours           Patient advised, she will use sparingly

## 2021-08-16 ENCOUNTER — OFFICE VISIT (OUTPATIENT)
Dept: CARDIOLOGY CLINIC | Age: 81
End: 2021-08-16
Payer: MEDICARE

## 2021-08-16 VITALS
SYSTOLIC BLOOD PRESSURE: 138 MMHG | WEIGHT: 150 LBS | BODY MASS INDEX: 29.45 KG/M2 | DIASTOLIC BLOOD PRESSURE: 74 MMHG | HEIGHT: 60 IN | HEART RATE: 72 BPM

## 2021-08-16 DIAGNOSIS — Z95.2 HISTORY OF TRANSCATHETER AORTIC VALVE REPLACEMENT (TAVR): Primary | ICD-10-CM

## 2021-08-16 PROCEDURE — 1123F ACP DISCUSS/DSCN MKR DOCD: CPT | Performed by: INTERNAL MEDICINE

## 2021-08-16 PROCEDURE — G8428 CUR MEDS NOT DOCUMENT: HCPCS | Performed by: INTERNAL MEDICINE

## 2021-08-16 PROCEDURE — 4040F PNEUMOC VAC/ADMIN/RCVD: CPT | Performed by: INTERNAL MEDICINE

## 2021-08-16 PROCEDURE — G8400 PT W/DXA NO RESULTS DOC: HCPCS | Performed by: INTERNAL MEDICINE

## 2021-08-16 PROCEDURE — 1090F PRES/ABSN URINE INCON ASSESS: CPT | Performed by: INTERNAL MEDICINE

## 2021-08-16 PROCEDURE — G8417 CALC BMI ABV UP PARAM F/U: HCPCS | Performed by: INTERNAL MEDICINE

## 2021-08-16 PROCEDURE — 1036F TOBACCO NON-USER: CPT | Performed by: INTERNAL MEDICINE

## 2021-08-16 PROCEDURE — 99214 OFFICE O/P EST MOD 30 MIN: CPT | Performed by: INTERNAL MEDICINE

## 2021-08-16 NOTE — PROGRESS NOTES
CARDIOLOGY NOTE      8/16/2021    RE: Nikhil Record  (1940)                               TO:  Dr. Layne Robertson, APRN - CNP            Gustavo Moon is a 78 y.o. female who was seen today for management of  VHD                               HPI:                   The patient does  have cardiac complaints of feeling exhausted and fatigued no SOB or CP. Patient also seen  for       - VHD  SP  TAVR  - Hypertension,is  well controlled, pt is  compliant with medicines'  - Hyperlipidimea, importance of hyperlipidimea discussed with pt. Nikhil Record has the following history recorded in care path:  Patient Active Problem List    Diagnosis Date Noted    Aortic stenosis 08/28/2017    SOBOE (shortness of breath on exertion) 08/28/2017    Fatigue     Dizziness 06/26/2018     Current Outpatient Medications   Medication Sig Dispense Refill    acetaminophen (TYLENOL) 325 MG tablet Take 650 mg by mouth every 6 hours as needed for Pain      vitamin C (ASCORBIC ACID) 500 MG tablet Take 500 mg by mouth daily      vitamin D 25 MCG (1000 UT) CAPS Take by mouth      simvastatin (ZOCOR) 40 MG tablet Take 1 tablet by mouth nightly 90 tablet 3    metoprolol tartrate (LOPRESSOR) 25 MG tablet Take 0.5 tablets by mouth 2 times daily 90 tablet 3    aspirin 81 MG tablet Take 81 mg by mouth daily      melatonin 3 MG TABS tablet Take 3 mg by mouth daily      oxybutynin (DITROPAN) 5 MG tablet Take 5 mg by mouth 2 times daily       Multiple Vitamins-Minerals (THERAPEUTIC MULTIVITAMIN-MINERALS) tablet Take 1 tablet by mouth daily      CALCIUM PO Take  by mouth daily.  GLUCOSAMINE-CHONDROITIN PO Take  by mouth daily.  Cyanocobalamin (VITAMIN B-12 PO) Take 2,500 mg by mouth daily       FLUoxetine (PROZAC) 10 MG tablet Take 10 mg by mouth daily. No current facility-administered medications for this visit.      Allergies: Tape Yadi Porteous tape]  Past Medical History:   Diagnosis Date    Chest discomfort     Fatigue     H/O 24 hour EKG monitoring 01/06/2009 1/6/2009 - Baseline rhythm mechanism is normal sinus. Breif episodes of SVT & sinus tachycardia noted. No clinically significant arrhythmias seen. No untoward symptoms charted in patient's diary.  H/O cardiovascular stress test 01/06/2009 1/6/2009 - Normal pattern of perfusion in all regions. LV is normal in size. No wall motion abnormality seen. LVSF is normal. Exercise capcity 8 METS. Exercise capacity is normal. No ECG changes.  H/O cardiovascular stress test 12/21/2020    EF 75% abnormal stress test    H/O Doppler ultrasound 07/10/2018    carotid - bilateral mild disease    H/O echocardiogram 4/24/2012, 1/5/2010 4/24/2012 - LVSF is normal. EF = >55%. Impaired LV relaxation. Mild TR, pulmonary HTN, aortic valve calcification & valvular aortic stenosis. Brittani Grajeda H/O echocardiogram 08/14/2017    EF55-60% mod-severe AS, mild AR, mild-mod MR    H/O echocardiogram 08/01/2019    EF 55-60%, normally functioning bioprosthetic valve in aortic position, Mild-Mod TR, Mild Pulm HTN    H/O echocardiogram 12/21/2020    EF 55-60% normally functioning bioprostethic valve mild to moderate tricuspid regurgitation mild pulmonary htn no evidence of pericardial effusion.  History of blood transfusion     History of exercise stress test 03/10/2020    Treadmill, Physiological BP response to exercise. ETT non diagnostic due to baseline LBBB.  Hx of cardiovascular stress test 08/30/2017    EF 60% normal study    Hx of Doppler echocardiogram 07/06/2018    EF 55-60%  mod AR, mild MR, mild to mod TR, and mild pulmonic regurg. Mild pulmonary htn.    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    [place]    HYSTERECTOMY      OTHER SURGICAL HISTORY Left     Ear surgery      As reviewed   Family History   Problem Relation Age of Onset    Other Father         appendix ruptured    Heart Failure Mother         CHF    Hypertension Mother     Cancer Sister     Atrial Fibrillation Sister     Atrial Fibrillation Sister     Atrial Fibrillation Sister      Social History     Tobacco Use    Smoking status: Never Smoker    Smokeless tobacco: Never Used   Substance Use Topics    Alcohol use: No     Comment: CAFFEINE: 2-3 cups daily      Review of Systems:    Constitutional:  fatigue  Psychological:Negative for anxiety or depression  HENT: Negative for headaches, nasal congestion, sinus pain or vertigo  Eyes: Negative for visual disturbance.    Endocrine: Negative for polydipsia/polyuria  Respiratory: Negative for shortness of breath  Cardiovascular: Negative for chest pain, dyspnea on exertion, claudication, edema, irregular heartbeat, murmur, palpitations or shortness of breath  Gastrointestinal: Negative for abdominal pain or heartburn  Genito-Urinary: Negative for urinary frequency/urgency  Musculoskeletal: Negative for muscle pain, muscular weakness, negative for pain in arm and leg or swelling in foot and leg  Neurological: Negative for dizziness, headaches, memory loss, numbness/tingling, visual changes, syncope  Dermatological: Negative for rash    Objective:    Vitals:    08/16/21 1559   BP: 138/74   Pulse: 72   Weight: 150 lb (68 kg)   Height: 5' (1.524 m)     /74   Pulse 72   Ht 5' (1.524 m)   Wt 150 lb (68 kg)   BMI 29.29 kg/m²     Patient-Reported Vitals 1/29/2021   Patient-Reported Weight 149lb   Patient-Reported Height 5ft   Patient-Reported Systolic 329   Patient-Reported Diastolic 71   Patient-Reported Pulse 75        Wt Readings from Last 3 Encounters:   08/16/21 150 lb (68 kg)   12/28/20 147 lb 12.8 oz (67 kg) 12/04/20 148 lb (67.1 kg)     Body mass index is 29.29 kg/m². GENERAL - Alert, oriented, pleasant, in no apparent distress. EYES: No jaundice, no conjunctival pallor. SKIN: It is warm & dry. No rashes. No Echhymosis    HEENT  No clinically significant abnormalities seen. Neck - Supple. No jugular venous distention noted. No carotid bruits. Cardiovascular  Normal S1 and S2 without obvious murmur or gallop. Extremities - No cyanosis, clubbing, or significant edema. Pulmonary  No respiratory distress. No wheezes or rales. Abdomen  No masses, tenderness, or organomegaly. Musculoskeletal  No significant edema. No joint deformities. No muscle wasting. Neurologic  Cranial nerves II through XII are grossly intact. There were no gross focal neurologic abnormalities. Lab Review   No results found for: CKTOTAL, CKMB, CKMBINDEX, TROPONINT  BNP:  No results found for: BNP  PT/INR:  No results found for: INR  Lab Results   Component Value Date    LABA1C 5.7 08/20/2018     Lab Results   Component Value Date    WBC 6.11 12/12/2018    HCT 38.3 12/12/2018    MCV 94.5 08/20/2018     12/12/2018     Lab Results   Component Value Date    CHOL 156 01/19/2021    TRIG 109 01/19/2021    HDL 53 01/19/2021    LDLCALC 83 01/19/2021    LDLDIRECT 114 (H) 08/20/2018     Lab Results   Component Value Date    ALT 18 11/07/2018    AST 22 11/07/2018     BMP:    Lab Results   Component Value Date     12/12/2018    K 3.8 12/12/2018     12/12/2018    CO2 25 12/12/2018    BUN 16 12/12/2018    CREATININE 0.66 12/12/2018     CMP:   Lab Results   Component Value Date     12/12/2018    K 3.8 12/12/2018     12/12/2018    CO2 25 12/12/2018    BUN 16 12/12/2018    PROT 6.1 08/20/2018     TSH:    Lab Results   Component Value Date    TSHHS 1.680 12/28/2020         Impression:    1.  History of transcatheter aortic valve replacement (TAVR)       Patient Active Problem List   Diagnosis Code    Aortic stenosis I35.0    SOBOE (shortness of breath on exertion) R06.02    Dizziness R42    Fatigue R53.83       Assessment & Plan:    -  -  Hypertension: Patients blood pressure is normal. Patient is advised about low sodium diet. Present medical regimen will not be changed. on metoprolol         -  LIPID MANAGEMENT:  Importance of lipid levels discussed with patient   and patient was given dietary advice. NCEP- ATP III guidelines reviewed with patient. -   Changes  in medicines made: No     On zocor                       - VHD  SP  TAVR   Stable    - had knee surgery  ?  DVT per PCP        Adwoa Hernandez MD    [address] [facility]

## 2021-08-16 NOTE — PATIENT INSTRUCTIONS
Please be informed that if you contact our office outside of normal business hours the physician on call cannot help with any scheduling or rescheduling issues, procedure instruction questions or any type of medication issue. We advise you for any urgent/emergency that you go to the nearest emergency room! PLEASE CALL OUR OFFICE DURING NORMAL BUSINESS HOURS    Monday - Friday   8 am to 5 pm    [place] [address]: [phone]    [place]:  [phone]      **It is YOUR responsibilty to bring medication bottles and/or updated medication list to [address] [place] [address].  This will allow us to better serve you and all your healthcare needs**

## 2021-08-20 RX ORDER — SIMVASTATIN 40 MG
40 TABLET ORAL NIGHTLY
Qty: 90 TABLET | Refills: 3 | Status: SHIPPED | OUTPATIENT
Start: 2021-08-20

## 2021-12-17 ENCOUNTER — HOSPITAL ENCOUNTER (OUTPATIENT)
Dept: SLEEP CENTER | Age: 81
Discharge: HOME | End: 2021-12-17
Payer: MEDICARE

## 2021-12-17 ENCOUNTER — OFFICE VISIT (OUTPATIENT)
Dept: FAMILY MEDICINE CLINIC | Age: 81
End: 2021-12-17
Payer: MEDICARE

## 2021-12-17 VITALS
SYSTOLIC BLOOD PRESSURE: 164 MMHG | TEMPERATURE: 97.6 F | BODY MASS INDEX: 28.98 KG/M2 | OXYGEN SATURATION: 97 % | WEIGHT: 148.4 LBS | HEART RATE: 78 BPM | DIASTOLIC BLOOD PRESSURE: 88 MMHG

## 2021-12-17 VITALS
OXYGEN SATURATION: 99 % | SYSTOLIC BLOOD PRESSURE: 143 MMHG | DIASTOLIC BLOOD PRESSURE: 86 MMHG | WEIGHT: 150 LBS | HEIGHT: 60 IN | HEART RATE: 71 BPM | BODY MASS INDEX: 29.45 KG/M2

## 2021-12-17 DIAGNOSIS — R06.83 SNORING: ICD-10-CM

## 2021-12-17 DIAGNOSIS — K08.89 PAIN, DENTAL: Primary | ICD-10-CM

## 2021-12-17 DIAGNOSIS — G47.10 HYPERSOMNOLENCE: Primary | ICD-10-CM

## 2021-12-17 PROCEDURE — 1090F PRES/ABSN URINE INCON ASSESS: CPT | Performed by: PHYSICIAN ASSISTANT

## 2021-12-17 PROCEDURE — G8427 DOCREV CUR MEDS BY ELIG CLIN: HCPCS | Performed by: PHYSICIAN ASSISTANT

## 2021-12-17 PROCEDURE — G8400 PT W/DXA NO RESULTS DOC: HCPCS | Performed by: PHYSICIAN ASSISTANT

## 2021-12-17 PROCEDURE — 1123F ACP DISCUSS/DSCN MKR DOCD: CPT | Performed by: PHYSICIAN ASSISTANT

## 2021-12-17 PROCEDURE — 1036F TOBACCO NON-USER: CPT | Performed by: PHYSICIAN ASSISTANT

## 2021-12-17 PROCEDURE — G8484 FLU IMMUNIZE NO ADMIN: HCPCS | Performed by: PHYSICIAN ASSISTANT

## 2021-12-17 PROCEDURE — 99211 OFF/OP EST MAY X REQ PHY/QHP: CPT

## 2021-12-17 PROCEDURE — 4040F PNEUMOC VAC/ADMIN/RCVD: CPT | Performed by: PHYSICIAN ASSISTANT

## 2021-12-17 PROCEDURE — G8417 CALC BMI ABV UP PARAM F/U: HCPCS | Performed by: PHYSICIAN ASSISTANT

## 2021-12-17 PROCEDURE — 99213 OFFICE O/P EST LOW 20 MIN: CPT | Performed by: PHYSICIAN ASSISTANT

## 2021-12-17 RX ORDER — AMOXICILLIN AND CLAVULANATE POTASSIUM 875; 125 MG/1; MG/1
1 TABLET, FILM COATED ORAL 2 TIMES DAILY
Qty: 20 TABLET | Refills: 0 | Status: SHIPPED | OUTPATIENT
Start: 2021-12-17 | End: 2021-12-27

## 2021-12-17 ASSESSMENT — SLEEP AND FATIGUE QUESTIONNAIRES
HOW LIKELY ARE YOU TO NOD OFF OR FALL ASLEEP WHILE WATCHING TV: 3
ESS TOTAL SCORE: 15
HOW LIKELY ARE YOU TO NOD OFF OR FALL ASLEEP WHEN YOU ARE A PASSENGER IN A CAR FOR AN HOUR WITHOUT A BREAK: 3
HOW LIKELY ARE YOU TO NOD OFF OR FALL ASLEEP WHILE SITTING INACTIVE IN A PUBLIC PLACE: 1
HOW LIKELY ARE YOU TO NOD OFF OR FALL ASLEEP IN A CAR, WHILE STOPPED FOR A FEW MINUTES IN TRAFFIC: 0
HOW LIKELY ARE YOU TO NOD OFF OR FALL ASLEEP WHILE SITTING AND READING: 3
HOW LIKELY ARE YOU TO NOD OFF OR FALL ASLEEP WHILE SITTING QUIETLY AFTER LUNCH WITHOUT ALCOHOL: 2
HOW LIKELY ARE YOU TO NOD OFF OR FALL ASLEEP WHILE LYING DOWN TO REST IN THE AFTERNOON WHEN CIRCUMSTANCES PERMIT: 3
HOW LIKELY ARE YOU TO NOD OFF OR FALL ASLEEP WHILE SITTING AND TALKING TO SOMEONE: 0

## 2021-12-17 NOTE — PROGRESS NOTES
12/17/2021    [facility]    Chief Complaint   Patient presents with    Dental Pain     possible loose teeth-left side       HPI  History was obtained from patient  Summer Lopez is a 78 y.o. female who presents today with concerns for dental pain since x today. She was examining the area and realized she has two loose teeth. She denies sore throat,, ear pain, dysphagia, nausea, vomiting. She denies fever or chills. She denies gum swelling or obvious redness along the concerning teeth. She denies any drainage from this area. She denies recent or current cold-like symptoms such as nasal congestion or cough. She denies any history of TMJ. She does not believe she grinds or clenches her teeth at night. She has very anxious in office today  She states that her daughter always reminds her to go to the dentist frequently because if she gets a dental infection, it may cause infection around her bioprosthetic aortic valve. She called her dentist today but they were closed. REVIEW OF SYMPTOMS    Constitutional:  Denies fever, chills  Eyes:  Denies photophobia or discharge  ENT: Admits dental pain. See HPI denies sore throat or ear pain  Cardiovascular:  Denies chest pain, palpitations or swelling  Respiratory:  Denies cough or shortness of breath  GI:  Denies abdominal pain, nausea, vomiting, or diarrhea  Skin: Denies skin rashes  Neurologic:  Denies headache  Lymphatic:  Denies swollen glands    PAST MEDICAL HISTORY  Past Medical History:   Diagnosis Date    Chest discomfort     Fatigue     H/O 24 hour EKG monitoring 01/06/2009 1/6/2009 - Baseline rhythm mechanism is normal sinus. Breif episodes of SVT & sinus tachycardia noted. No clinically significant arrhythmias seen. No untoward symptoms charted in patient's diary.  H/O cardiovascular stress test 01/06/2009 1/6/2009 - Normal pattern of perfusion in all regions. LV is normal in size. No wall motion abnormality seen.  LVSF is normal. Exercise capcity 8 METS. Exercise capacity is normal. No ECG changes.  H/O cardiovascular stress test 12/21/2020    EF 75% abnormal stress test    H/O Doppler ultrasound 07/10/2018    carotid - bilateral mild disease    H/O echocardiogram 4/24/2012, 1/5/2010 4/24/2012 - LVSF is normal. EF = >55%. Impaired LV relaxation. Mild TR, pulmonary HTN, aortic valve calcification & valvular aortic stenosis. Farnaz Acosta H/O echocardiogram 08/14/2017    EF55-60% mod-severe AS, mild AR, mild-mod MR    H/O echocardiogram 08/01/2019    EF 55-60%, normally functioning bioprosthetic valve in aortic position, Mild-Mod TR, Mild Pulm HTN    H/O echocardiogram 12/21/2020    EF 55-60% normally functioning bioprostethic valve mild to moderate tricuspid regurgitation mild pulmonary htn no evidence of pericardial effusion.  History of blood transfusion     History of exercise stress test 03/10/2020    Treadmill, Physiological BP response to exercise. ETT non diagnostic due to baseline LBBB.  Hx of cardiovascular stress test 08/30/2017    EF 60% normal study    Hx of Doppler echocardiogram 07/06/2018    EF 55-60%  mod AR, mild MR, mild to mod TR, and mild pulmonic regurg. Mild pulmonary htn.  Hyperlipemia     Hypertension     Malaise and fatigue     Osteoarthritis     Restless leg     S/P TAVR (transcatheter aortic valve replacement) 09/24/2018    [place]    Sleep disorder     Spinal stenosis     VHD (valvular heart disease)        FAMILY HISTORY  Family History   Problem Relation Age of Onset    Other Father         appendix ruptured    Heart Failure Mother         CHF    Hypertension Mother     Cancer Sister     Atrial Fibrillation Sister     Atrial Fibrillation Sister     Atrial Fibrillation Sister        SOCIAL HISTORY  Social History     Socioeconomic History    Marital status:       Spouse name: None    Number of children: None    Years of education: None    Highest education level: None   Occupational (AUGMENTIN) 875-125 MG per tablet Take 1 tablet by mouth 2 times daily for 10 days 20 tablet 0    simvastatin (ZOCOR) 40 MG tablet Take 1 tablet by mouth nightly 90 tablet 3    acetaminophen (TYLENOL) 325 MG tablet Take 650 mg by mouth every 6 hours as needed for Pain      vitamin C (ASCORBIC ACID) 500 MG tablet Take 500 mg by mouth daily      vitamin D 25 MCG (1000 UT) CAPS Take by mouth      metoprolol tartrate (LOPRESSOR) 25 MG tablet Take 0.5 tablets by mouth 2 times daily 90 tablet 3    aspirin 81 MG tablet Take 81 mg by mouth daily      melatonin 3 MG TABS tablet Take 3 mg by mouth daily      oxybutynin (DITROPAN) 5 MG tablet Take 5 mg by mouth 2 times daily       Multiple Vitamins-Minerals (THERAPEUTIC MULTIVITAMIN-MINERALS) tablet Take 1 tablet by mouth daily      CALCIUM PO Take  by mouth daily.  GLUCOSAMINE-CHONDROITIN PO Take  by mouth daily.  Cyanocobalamin (VITAMIN B-12 PO) Take 2,500 mg by mouth daily       FLUoxetine (PROZAC) 10 MG tablet Take 10 mg by mouth daily. No current facility-administered medications for this visit. ALLERGIES  Allergies   Allergen Reactions    Tape Tate Standing Tape]        PHYSICAL EXAM    BP (!) 164/88   Pulse 78   Temp 97.6 °F (36.4 °C) (Infrared)   Wt 148 lb 6.4 oz (67.3 kg)   SpO2 97%   Breastfeeding No   BMI 28.98 kg/m²     Constitutional:  Well developed, well nourished  HENT:  Normocephalic, atraumatic, bilateral external ears normal, bilateral ear canals and TMs normal, no pain with tragal manipulation, no mastoid pain, oropharynx moist, uvula midline and benign and airway patent. I do not appreciate any loose teeth. No tenderness to my palpation along the gumline of concerning left lower teeth. No edema or erythema of the gumline. No drainage. No pustules or abscess.   Eyes:   conjunctiva normal, no discharge, no scleral icterus  Neck:  Normal range of motion, no tenderness, supple  Lymphatic:  No lymphadenopathy noted  Cardiovascular:  Normal heart rate, normal rhythm, no murmurs, gallops or rubs  Thorax & Lungs:  Normal breath sounds, no respiratory distress, no wheezing, no rales, no rhonchi  Skin:  Warm, dry, no erythema, no rash  Neurologic:  Alert & oriented   Psychiatric:  Affect normal, mood normal    ASSESSMENT & PLAN    Raza Cassidy was seen today for dental pain. Diagnoses and all orders for this visit:    Pain, dental  -     amoxicillin-clavulanate (AUGMENTIN) 875-125 MG per tablet; Take 1 tablet by mouth 2 times daily for 10 days       I do not appreciate any dental infection. Patient is very convinced she has an infection. We will do a trial of Augmentin. Warm salt water gargles encouraged. She was also encouraged to stop pressing and pulling on her teeth. Patient was encouraged to follow-up with her dentist as soon as possible. ER for any sudden changes. There are no discontinued medications. No follow-ups on file. Plan of care reviewed with patient who verbalizes understanding and wishes to continue. Patient verbalizes understanding with the above plan and is in agreement. Patient will call with  worsening of symptoms, questions or concerns. Please note that this chart was generated using dragon dictation software. Although every effort was made to ensure the accuracy of this automated transcription, some errors in transcription may have occurred.     Electronically signed by Lionel Bhat PA-C on 12/17/2021

## 2021-12-17 NOTE — PATIENT INSTRUCTIONS
Patient Education        Tooth and Gum Pain: Care Instructions  Your Care Instructions     The most common causes of dental pain are tooth decay and gum disease. Pain can also be caused by an infection of the tooth (abscess) or the gums. Or you may have pain from a broken or cracked tooth. Other causes of pain include infection and damage to a tooth from nervous grinding of your teeth. A wisdom tooth can be painful when it is coming in but cannot break through the gum. It can also be painful when the tooth is only partway in and extra gum tissue has formed around it. The tissue can get inflamed (pericoronitis), and sometimes it gets infected. Prompt dental care can help find the cause of your toothache and keep the tooth from dying or gum disease from getting worse. Self-care at home may reduce your pain and discomfort. Follow-up care is a key part of your treatment and safety. Be sure to make and go to all appointments, and call your dentist or doctor if you are having problems. It's also a good idea to know your test results and keep a list of the medicines you take. How can you care for yourself at home? · To reduce pain and facial swelling, put an ice or cold pack on the outside of your cheek for 10 to 20 minutes at a time. Put a thin cloth between the ice and your skin. Do not use heat. · If your doctor prescribed antibiotics, take them as directed. Do not stop taking them just because you feel better. You need to take the full course of antibiotics. · Ask your doctor if you can take an over-the-counter pain medicine, such as acetaminophen (Tylenol), ibuprofen (Advil, Motrin), or naproxen (Aleve). Be safe with medicines. Read and follow all instructions on the label. · Avoid very hot, cold, or sweet foods and drinks if they increase your pain. · Rinse your mouth with warm salt water every 2 hours to help relieve pain and swelling. Mix 1 teaspoon of salt in 8 ounces of water.   · Talk to your dentist about using special toothpaste for sensitive teeth. To reduce pain on contact with heat or cold or when brushing, brush with this toothpaste regularly or rub a small amount of the paste on the sensitive area with a clean finger 2 or 3 times a day. Floss gently between your teeth. · Do not smoke or use spit tobacco. Tobacco use can make gum problems worse, decreases your ability to fight infection in your gums, and delays healing. If you need help quitting, talk to your doctor about stop-smoking programs and medicines. These can increase your chances of quitting for good. When should you call for help? Call 911 anytime you think you may need emergency care. For example, call if:    · You have trouble breathing. Call your dentist or doctor now or seek immediate medical care if:    · You have signs of infection, such as:  ? Increased pain, swelling, warmth, or redness. ? Red streaks leading from the area. ? Pus draining from the area. ? A fever. Watch closely for changes in your health, and be sure to contact your doctor if:    · You do not get better as expected. Where can you learn more? Go to [email or web address] org and sign in to your [employer] account. Enter [id number] in the COPsync box to learn more about \"Tooth and Gum Pain: Care Instructions. \"     If you do not have an account, please click on the \"Sign Up Now\" link. Current as of: June 30, 2021               Content Version: 13.0  © 2006-2021 [employer]. Care instructions adapted under license by [employer]. If you have questions about a medical condition or this instruction, always ask your healthcare professional. [place] [address] any warranty or liability for your use of this information.

## 2021-12-17 NOTE — PROGRESS NOTES
José Da Silva MD, Winnie Swenson MD, Keyon Bullard MD, [facility]      [address]   [address], [address] [facility]   PH: [phone]  F: [phone]     Subjective:     Patient ID: John Mays is a 78 y.o. female, referred to the sleep center for consultation with a sleep specialist.     Reason for Consultation/Chief Complaint:   Chief Complaint   Patient presents with    Other     G47.8 Other sleep disorders       Referring physician:  Thu DANIELLE-CNP    Symptoms:   [x]  Snoring                                                                [x]  Dry Mouth  []  Choking                                                               [x]  Morning Headaches  []  Gasping for Air                                                    []  Trouble Falling asleep  [x]  Tired during the daytime                                     []  Trouble Staying Asleep  [x]  Tired when you wake up                                     []  Weight Gain in Last 5 Years  []  Wake up frequently at night                                []  Weight Loss in Last 5 Years  []  Shortness Of Breath                                            []  Shift Worker  []  Coughing                                                             []  Smoker (Previous or Current)  []  Chest Pain                                                           []  Anxiety  []  Trouble keeping your legs still at night                []  Depression  []  Kicking your legs in your sleep                            []  Insomnia            []  Other:     Significant Co-morbidities:  []  Congestive Heart Failure     []  COPD         []  Stroke (Past 30 Days)      []  Supplemental Oxygen Usage       []  Cognitive Impairment      []  Neuromuscular Problems  []  Epilepsy/Neurological Disorders         Duration of Sleep Problems:    History:    Social History     Socioeconomic History    Marital status:      Spouse name: Not on file    Number of children: Not on file    Years of education: Not on file    Highest education level: Not on file   Occupational History    Occupation: Retired   Tobacco Use    Smoking status: Never Smoker    Smokeless tobacco: Never Used   Vaping Use    Vaping Use: Never used   Substance and Sexual Activity    Alcohol use: No     Comment: CAFFEINE: 2-3 cups daily    Drug use: No    Sexual activity: Not on file   Other Topics Concern    Not on file   Social History Narrative    Not on file     Social Determinants of Health     Financial Resource Strain:     Difficulty of Paying Living Expenses: Not on file   Food Insecurity:     Worried About [address] [employer] in the Last Year: Not on file    [address] [place] in the Last Year: Not on file   Transportation Needs:     Lack of Transportation (Medical): Not on file    Lack of Transportation (Non-Medical): Not on file   Physical Activity:     Days of Exercise per Week: Not on file    Minutes of Exercise per Session: Not on file   Stress:     Feeling of Stress : Not on file   Social Connections:     Frequency of Communication with Friends and Family: Not on file    Frequency of Social Gatherings with Friends and Family: Not on file    Attends Jain Services: Not on file    Active Member of [address] [place] [address] or Organizations: Not on file    Attends Club or Organization Meetings: Not on file    Marital Status: Not on file   Intimate Partner Violence:     Fear of Current or Ex-Partner: Not on file    Emotionally Abused: Not on file    Physically Abused: Not on file    Sexually Abused: Not on file   Housing Stability:     Unable to Pay for Housing in the Last Year: Not on file    Number of [place] in the Last Year: Not on file    Unstable Housing in the Last Year: Not on file       Prior to Admission medications    Medication Sig Start Date End Date Taking?  Authorizing Provider simvastatin (ZOCOR) 40 MG tablet Take 1 tablet by mouth nightly 8/20/21  Yes Gilberto Wetzel MD   acetaminophen (TYLENOL) 325 MG tablet Take 650 mg by mouth every 6 hours as needed for Pain   Yes Historical Provider, MD   vitamin C (ASCORBIC ACID) 500 MG tablet Take 500 mg by mouth daily   Yes Historical Provider, MD   vitamin D 25 MCG (1000 UT) CAPS Take by mouth   Yes Historical Provider, MD   metoprolol tartrate (LOPRESSOR) 25 MG tablet Take 0.5 tablets by mouth 2 times daily 12/4/20  Yes Gilberto Wetzel MD   aspirin 81 MG tablet Take 81 mg by mouth daily   Yes Historical Provider, MD   melatonin 3 MG TABS tablet Take 3 mg by mouth daily   Yes Historical Provider, MD   oxybutynin (DITROPAN) 5 MG tablet Take 5 mg by mouth 2 times daily  6/8/18  Yes Historical Provider, MD   Multiple Vitamins-Minerals (THERAPEUTIC MULTIVITAMIN-MINERALS) tablet Take 1 tablet by mouth daily   Yes Historical Provider, MD   CALCIUM PO Take  by mouth daily. Yes Historical Provider, MD   GLUCOSAMINE-CHONDROITIN PO Take  by mouth daily. Yes Historical Provider, MD   Cyanocobalamin (VITAMIN B-12 PO) Take 2,500 mg by mouth daily    Yes Historical Provider, MD   FLUoxetine (PROZAC) 10 MG tablet Take 10 mg by mouth daily. Yes Historical Provider, MD       Allergies as of 12/17/2021 - Fully Reviewed 12/17/2021   Allergen Reaction Noted    Tape [adhesive tape]  03/18/2013       Patient Active Problem List   Diagnosis    Aortic stenosis    SOBOE (shortness of breath on exertion)    Dizziness    Fatigue    Snoring    Hypersomnolence       Past Medical History:   Diagnosis Date    Chest discomfort     Fatigue     H/O 24 hour EKG monitoring 01/06/2009 1/6/2009 - Baseline rhythm mechanism is normal sinus. Breif episodes of SVT & sinus tachycardia noted. No clinically significant arrhythmias seen. No untoward symptoms charted in patient's diary.     H/O cardiovascular stress test 01/06/2009 1/6/2009 - Normal pattern of perfusion in all regions. LV is normal in size. No wall motion abnormality seen. LVSF is normal. Exercise capcity 8 METS. Exercise capacity is normal. No ECG changes.  H/O cardiovascular stress test 12/21/2020    EF 75% abnormal stress test    H/O Doppler ultrasound 07/10/2018    carotid - bilateral mild disease    H/O echocardiogram 4/24/2012, 1/5/2010 4/24/2012 - LVSF is normal. EF = >55%. Impaired LV relaxation. Mild TR, pulmonary HTN, aortic valve calcification & valvular aortic stenosis. Marlon Huain H/O echocardiogram 08/14/2017    EF55-60% mod-severe AS, mild AR, mild-mod MR    H/O echocardiogram 08/01/2019    EF 55-60%, normally functioning bioprosthetic valve in aortic position, Mild-Mod TR, Mild Pulm HTN    H/O echocardiogram 12/21/2020    EF 55-60% normally functioning bioprostethic valve mild to moderate tricuspid regurgitation mild pulmonary htn no evidence of pericardial effusion.  History of blood transfusion     History of exercise stress test 03/10/2020    Treadmill, Physiological BP response to exercise. ETT non diagnostic due to baseline LBBB.  Hx of cardiovascular stress test 08/30/2017    EF 60% normal study    Hx of Doppler echocardiogram 07/06/2018    EF 55-60%  mod AR, mild MR, mild to mod TR, and mild pulmonic regurg. Mild pulmonary htn.    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 Neck - Neck circumference: 15  Inches  [place] - Total score: 15    REVIEW OF SYSTEMS:  Gen: No distress. Eyes: PERRL. No sclera icterus. No conjunctival injection. ENT: No discharge. Pharynx clear. External appearance of ears and nose normal.  Neck: Trachea midline. No obvious mass. Resp: No accessory muscle use. No crackles. No wheezes. No rhonchi. No dullness on percussion. CV: Regular rate. Regular rhythm. No murmur or rub. No edema. GI: Non-tender. Non-distended. No hernia. Skin: Warm, dry, normal texture and turgor. No nodule on exposed extremities. Lymph: No cervical LAD. No supraclavicular LAD. M/S: No cyanosis. No clubbing. No joint deformity. Psych: Oriented x 3. No anxiety. Awake. Alert. Intact judgement and insight. Mallampati Airway Classification:   []1 []2 [x]3 []4          Previous CPAP Usage:    [x]  Patient has never worn PAP. []  Patient has worn PAP previously but discontinued use. []  Current PAP user. Assessment:      Diagnosis:   EDS Snoring R/O FLO. Plan:     1. HST. 2. Sleep hygiene  3. RTO 1 mth.

## 2022-05-17 ENCOUNTER — OFFICE VISIT (OUTPATIENT)
Dept: CARDIOLOGY CLINIC | Age: 82
End: 2022-05-17
Payer: MEDICARE

## 2022-05-17 VITALS
BODY MASS INDEX: 29.96 KG/M2 | SYSTOLIC BLOOD PRESSURE: 118 MMHG | WEIGHT: 152.6 LBS | HEART RATE: 67 BPM | DIASTOLIC BLOOD PRESSURE: 76 MMHG | HEIGHT: 60 IN

## 2022-05-17 DIAGNOSIS — Z95.2 HISTORY OF TRANSCATHETER AORTIC VALVE REPLACEMENT (TAVR): Primary | ICD-10-CM

## 2022-05-17 PROCEDURE — G8427 DOCREV CUR MEDS BY ELIG CLIN: HCPCS | Performed by: INTERNAL MEDICINE

## 2022-05-17 PROCEDURE — 99214 OFFICE O/P EST MOD 30 MIN: CPT | Performed by: INTERNAL MEDICINE

## 2022-05-17 PROCEDURE — G8417 CALC BMI ABV UP PARAM F/U: HCPCS | Performed by: INTERNAL MEDICINE

## 2022-05-17 PROCEDURE — 1090F PRES/ABSN URINE INCON ASSESS: CPT | Performed by: INTERNAL MEDICINE

## 2022-05-17 PROCEDURE — G8400 PT W/DXA NO RESULTS DOC: HCPCS | Performed by: INTERNAL MEDICINE

## 2022-05-17 PROCEDURE — 93000 ELECTROCARDIOGRAM COMPLETE: CPT | Performed by: INTERNAL MEDICINE

## 2022-05-17 PROCEDURE — 4040F PNEUMOC VAC/ADMIN/RCVD: CPT | Performed by: INTERNAL MEDICINE

## 2022-05-17 PROCEDURE — 1123F ACP DISCUSS/DSCN MKR DOCD: CPT | Performed by: INTERNAL MEDICINE

## 2022-05-17 PROCEDURE — 1036F TOBACCO NON-USER: CPT | Performed by: INTERNAL MEDICINE

## 2022-05-17 NOTE — PATIENT INSTRUCTIONS
**It is YOUR responsibilty to bring medication bottles and/or updated medication list to [address] [place] [address]. This will allow us to better serve you and all your healthcare needs**    Please be informed that if you contact our office outside of normal business hours the physician on call cannot help with any scheduling or rescheduling issues, procedure instruction questions or any type of medication issue. We advise you for any urgent/emergency that you go to the nearest emergency room!     PLEASE CALL OUR OFFICE DURING NORMAL BUSINESS HOURS    Monday - Friday   8 am to 5 pm    [place]: [address]: [phone]    [place]:  [phone]

## 2022-05-17 NOTE — PROGRESS NOTES
CARDIOLOGY NOTE      5/17/2022    RE: Khloe Christianson  (1940)                               TO:  Dr. Kisha Cameron, APRN - CNP            Kristin Xie is a 78 y.o. female who was seen today for management of valvular heart disease                                    HPI:                   Pt has h/o valvular heart disease, status post TAVR, hypertension, hyperlipidemia, seen today for follow-up. Pt has no cardiac complaints  Hands are numb from [address] has the following history recorded in care path:  Patient Active Problem List    Diagnosis Date Noted    Aortic stenosis 08/28/2017    SOBOE (shortness of breath on exertion) 08/28/2017    Snoring 12/17/2021    Hypersomnolence 12/17/2021    Fatigue     Dizziness 06/26/2018     Current Outpatient Medications   Medication Sig Dispense Refill    simvastatin (ZOCOR) 40 MG tablet Take 1 tablet by mouth nightly 90 tablet 3    acetaminophen (TYLENOL) 325 MG tablet Take 650 mg by mouth every 6 hours as needed for Pain      vitamin C (ASCORBIC ACID) 500 MG tablet Take 500 mg by mouth daily      vitamin D 25 MCG (1000 UT) CAPS Take by mouth      metoprolol tartrate (LOPRESSOR) 25 MG tablet Take 0.5 tablets by mouth 2 times daily 90 tablet 3    aspirin 81 MG tablet Take 81 mg by mouth daily      melatonin 3 MG TABS tablet Take 3 mg by mouth daily      oxybutynin (DITROPAN) 5 MG tablet Take 5 mg by mouth 2 times daily       Multiple Vitamins-Minerals (THERAPEUTIC MULTIVITAMIN-MINERALS) tablet Take 1 tablet by mouth daily      CALCIUM PO Take  by mouth daily.  GLUCOSAMINE-CHONDROITIN PO Take  by mouth daily.  Cyanocobalamin (VITAMIN B-12 PO) Take 2,500 mg by mouth daily       FLUoxetine (PROZAC) 10 MG tablet Take 10 mg by mouth daily. No current facility-administered medications for this visit.      Allergies: Tape Pat [place] tape]  Past Medical History:   Diagnosis Date    Chest discomfort     Fatigue     H/O 24 hour EKG monitoring 01/06/2009 1/6/2009 - Baseline rhythm mechanism is normal sinus. Breif episodes of SVT & sinus tachycardia noted. No clinically significant arrhythmias seen. No untoward symptoms charted in patient's diary.  H/O cardiovascular stress test 01/06/2009 1/6/2009 - Normal pattern of perfusion in all regions. LV is normal in size. No wall motion abnormality seen. LVSF is normal. Exercise capcity 8 METS. Exercise capacity is normal. No ECG changes.  H/O cardiovascular stress test 12/21/2020    EF 75% abnormal stress test    H/O Doppler ultrasound 07/10/2018    carotid - bilateral mild disease    H/O echocardiogram 4/24/2012, 1/5/2010 4/24/2012 - LVSF is normal. EF = >55%. Impaired LV relaxation. Mild TR, pulmonary HTN, aortic valve calcification & valvular aortic stenosis. Dayanara Matos H/O echocardiogram 08/14/2017    EF55-60% mod-severe AS, mild AR, mild-mod MR    H/O echocardiogram 08/01/2019    EF 55-60%, normally functioning bioprosthetic valve in aortic position, Mild-Mod TR, Mild Pulm HTN    H/O echocardiogram 12/21/2020    EF 55-60% normally functioning bioprostethic valve mild to moderate tricuspid regurgitation mild pulmonary htn no evidence of pericardial effusion.  History of blood transfusion     History of exercise stress test 03/10/2020    Treadmill, Physiological BP response to exercise. ETT non diagnostic due to baseline LBBB.  Hx of cardiovascular stress test 08/30/2017    EF 60% normal study    Hx of Doppler echocardiogram 07/06/2018    EF 55-60%  mod AR, mild MR, mild to mod TR, and mild pulmonic regurg. Mild pulmonary htn.    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    [place]    HYSTERECTOMY      OTHER SURGICAL HISTORY Left     Ear surgery      As reviewed   Family History   Problem Relation Age of Onset    Other Father         appendix ruptured    Heart Failure Mother         CHF    Hypertension Mother     Cancer Sister     Atrial Fibrillation Sister     Atrial Fibrillation Sister     Atrial Fibrillation Sister      Social History     Tobacco Use    Smoking status: Never Smoker    Smokeless tobacco: Never Used   Substance Use Topics    Alcohol use: No     Comment: CAFFEINE: 2-3 cups daily        Objective:    Vitals:    05/17/22 0926   BP: 118/76   Pulse: 67   Weight: 152 lb 9.6 oz (69.2 kg)   Height: 5' (1.524 m)     /76   Pulse 67   Ht 5' (1.524 m)   Wt 152 lb 9.6 oz (69.2 kg)   BMI 29.80 kg/m²     Patient-Reported Vitals 1/29/2021   Patient-Reported Weight 149lb   Patient-Reported Height 5ft   Patient-Reported Systolic 985   Patient-Reported Diastolic 71   Patient-Reported Pulse 75        Wt Readings from Last 3 Encounters:   05/17/22 152 lb 9.6 oz (69.2 kg)   12/17/21 150 lb (68 kg)   12/17/21 148 lb 6.4 oz (67.3 kg)     Body mass index is 29.8 kg/m². GENERAL - Alert, oriented, pleasant, in no apparent distress. EYES: No jaundice, no conjunctival pallor. SKIN: It is warm & dry. No rashes. No Echhymosis    HEENT - No clinically significant abnormalities seen. Neck - Supple. No jugular venous distention noted. No carotid bruits. Cardiovascular - Normal S1 and S2 without obvious murmur or gallop. Extremities - No cyanosis, clubbing, or significant edema. Pulmonary - No respiratory distress. No wheezes or rales. Abdomen - No masses, tenderness, or organomegaly. Musculoskeletal - No significant edema. No joint deformities. No muscle wasting. Neurologic - Cranial nerves II through XII are grossly intact. There were no gross focal neurologic abnormalities.     Lab Review   No results found for: CKTOTAL, CKMB, CKMBINDEX, TROPONINT  BNP:  No results found for: BNP  PT/INR:  No results found for: INR  Lab Results   Component Value Date    LABA1C 5.7 08/20/2018     Lab Results   Component Value Date    WBC 6.11 12/12/2018    HCT 38.3 12/12/2018    MCV 94.5 08/20/2018     12/12/2018     Lab Results   Component Value Date    CHOL 156 01/19/2021    TRIG 109 01/19/2021    HDL 53 01/19/2021    LDLCALC 83 01/19/2021    LDLDIRECT 114 (H) 08/20/2018     Lab Results   Component Value Date    ALT 18 11/07/2018    AST 22 11/07/2018     BMP:    Lab Results   Component Value Date     12/12/2018    K 3.8 12/12/2018     12/12/2018    CO2 25 12/12/2018    BUN 16 12/12/2018    CREATININE 0.66 12/12/2018     CMP:   Lab Results   Component Value Date     12/12/2018    K 3.8 12/12/2018     12/12/2018    CO2 25 12/12/2018    BUN 16 12/12/2018    PROT 6.1 08/20/2018     TSH:    Lab Results   Component Value Date    TSHHS 1.680 12/28/2020           Assessment & Plan:    -  Hypertension: Patients blood pressure is normal. Patient is advised about low sodium diet. Present medical regimen will not be changed. On metoprolol 25 mg p.o. twice daily blood pressure is stable compliant           -  LIPID MANAGEMENT:  Importance of lipid levels discussed with patient   and patient was given dietary advice. NCEP- ATP III guidelines reviewed with patient. -   Changes  in medicines made:  No                on simvastatin 40 mg p.o. daily patient is compliant we will check the lipid profile         Last LDL on file is 80    -Status post TAVR patient had severe aortic stenosis had TAVR done few years ago has remained stable  Last echo in December 2020 was normal  Reecho for TAVR             Krzysztof Stone MD    [facility] [place]    Please note this report has been partially produced using speech recognition software and may contain errors related to that system including errors in grammar, punctuation, and spelling, as well as words and phrases that may be inappropriate. If there are any questions or concerns please feel free to contact the dictating provider for clarification.

## 2022-06-07 ENCOUNTER — PROCEDURE VISIT (OUTPATIENT)
Dept: CARDIOLOGY CLINIC | Age: 82
End: 2022-06-07
Payer: MEDICARE

## 2022-06-07 DIAGNOSIS — Z95.2 HISTORY OF TRANSCATHETER AORTIC VALVE REPLACEMENT (TAVR): ICD-10-CM

## 2022-06-07 PROCEDURE — 93308 TTE F-UP OR LMTD: CPT | Performed by: INTERNAL MEDICINE

## 2022-06-13 ENCOUNTER — OFFICE VISIT (OUTPATIENT)
Dept: PHYSICAL MEDICINE AND REHAB | Age: 82
End: 2022-06-13
Payer: MEDICARE

## 2022-06-13 VITALS — TEMPERATURE: 96.6 F

## 2022-06-13 DIAGNOSIS — M54.12 C6 RADICULOPATHY: ICD-10-CM

## 2022-06-13 DIAGNOSIS — G56.21 ULNAR NEUROPATHY AT ELBOW, RIGHT: ICD-10-CM

## 2022-06-13 DIAGNOSIS — M79.602 PARESTHESIA AND PAIN OF BOTH UPPER EXTREMITIES: ICD-10-CM

## 2022-06-13 DIAGNOSIS — G56.03 BILATERAL CARPAL TUNNEL SYNDROME: Primary | ICD-10-CM

## 2022-06-13 DIAGNOSIS — R29.2: ICD-10-CM

## 2022-06-13 DIAGNOSIS — M79.601 PARESTHESIA AND PAIN OF BOTH UPPER EXTREMITIES: ICD-10-CM

## 2022-06-13 DIAGNOSIS — R20.2 PARESTHESIA AND PAIN OF BOTH UPPER EXTREMITIES: ICD-10-CM

## 2022-06-13 PROCEDURE — 95911 NRV CNDJ TEST 9-10 STUDIES: CPT | Performed by: PHYSICAL MEDICINE & REHABILITATION

## 2022-06-13 PROCEDURE — 95886 MUSC TEST DONE W/N TEST COMP: CPT | Performed by: PHYSICAL MEDICINE & REHABILITATION

## 2022-06-13 NOTE — PROGRESS NOTES
EMG REPORT     CHIEF COMPLAINT: Trouble using her right shoulder and both hands hurt. HISTORY OF PRESENT ILLNESS: 78 y.o. right hand dominant female with previously documented right C6 spinal nerve root injury (history of spinal stenosis) and bilateral CTS with surgical release in 2021. She reports her symptoms persist despite these interventions. She has trouble raising her right arm at the shoulder. She gets numbness and tingling in all fingertips and her thumbs. She has dramatic shaking of the right hand in certain positions and she drops things from her grasp. She sleeps fine. She has neck pain with limited radiation into the shoulders. She did not report any rashes or limb discoloration. She rated her pain severity as 5-7/10. She has no history of diabetes or any thyroid disorder. PHYSICAL EXAMINATION: Alert. Only about 30 degrees of active cervical spine rotation bilaterally. Spurling's maneuver was negative. Rodriguez's reflexes were present in both upper limbs. Thumb opposition MMT was 4 -/5 on the left and 4/5 on the right. Digit abduction seemed normal bilaterally. There was pain in the elbow and wrist with MMT more proximally complicating interpretation. There was no atrophy or tremor noted today. Sensation was blunted in all fingertips. NERVE CONDUCTION STUDIES:     MOTOR         LATENCY NORMAL AMPLITUDE DISTANCE COND. ESTER. RIGHT  MEDIAN 4.7 < 4.2 msec 3.8 8 cm 51   LEFT  MEDIAN 6.4 < 4.2 msec 0.6 8 cm 47   RIGHT  ULNAR 2.3 < 4.2 msec 7.6/6.6 8 cm 53/38   LEFT  ULNAR 2.8 < 4.2 msec 5.8 8 cm >50      SENSORY  ORTHODROMIC        LATENCY NORMAL AMPLITUDE DISTANCE   RIGHT MEDIAN Absent <2.3 msec  10 cm   LEFT  MEDIAN Absent < 2.3 msec  10 cm   RIGHT  ULNAR 2.3 < 2.3 msec 38 10 cm   LEFT  ULNAR 2.5 < 2.3 msec 17 10 cm       Right dorsal ulnar sensory: dL 2.3 msec, amp 6 microV.         NEEDLE EMG:      RIGHT   LEFT     Insertional Activity Spontaneous  Activity Volutional  MUAP's Insertional Activity Spontaneous  Activity Volutional  MUAP's   Cerv Parasp Guarding None Normal Normal None Normal   Infraspinatus Normal None Dec #, Larger Normal None Normal   Deltoid   Normal None \" Normal None Normal   Biceps   Normal None \" Normal None Normal   Triceps   Normal None Normal Normal None Normal   Pronator Teres Normal None Dec #, Larger Normal None Normal   Extensor Indicis Normal None Normal Normal None Normal   1st Dorsal Interosseus Normal None Normal Normal None Normal   Abductor Pollicis Br. Normal None Dec #, Larger Normal None Dec #, Larger       FINDINGS:   EMG of the cervical paraspinals and both upper limbs demonstrated paraspinal muscle guarding with needle exam of that region. There were distinct neuropathic motor units again identified in the right C6 myotome. Diminished number of larger motor units were seen in the APB muscles bilaterally. Median sensory responses were missing bilaterally. Median motor distal latencies were delayed bilaterally, more so on the left and there CMAP amplitudes were diminished. Ulnar motor nerve conductions were generally well-maintained except for slowing around the right elbow. Ulnar sensory recordings were normal.      IMPRESSION:      1. Abnormal EMG with some very familiar findings compared to an EMG in December 2020. Persistent median neuropathies exist at each wrist with some neurologic maturation compared to December 2020 (persistent bilateral CTS of moderate severity). 2.  Remote right C6 spinal nerve root injury (right C6 radiculopathy). Today the patient demonstrates bilateral Rodriguez's reflexes. These were not described on the previous EMG report. Please consider imaging of her cervical spine to evaluate changes in her known stenosis. 3.  Minor/mature ulnar neuropathy at the right elbow. 4.  No evidence of a concurrent plexopathy, generalized neuropathy or primary muscle disease.        Thank you for this interesting referral.

## 2022-06-13 NOTE — LETTER
June 13, 2022          Trinda Fabry, MD  [address] [place] [address]           Patient Name: Micah Madrigal   MRN Number: [id number]   YOB: 1940   Date Of Visit: 06/13/2022          Dear Trinda Fabry MD,      Thank you for referring Micah Madrigal to me for electrodiagnostic testing. Attached are the findings of the EMG and my assessment. If you have any further questions, please do not hesitate to call me. Thank you for this interesting referral.      Sincerely,          GABE Bergman MD.

## 2022-09-19 ENCOUNTER — HOSPITAL ENCOUNTER (OUTPATIENT)
Age: 82
Setting detail: OBSERVATION
Discharge: HOME | End: 2022-09-20
Attending: EMERGENCY MEDICINE | Admitting: INTERNAL MEDICINE
Payer: MEDICARE

## 2022-09-19 ENCOUNTER — APPOINTMENT (OUTPATIENT)
Dept: GENERAL RADIOLOGY | Age: 82
End: 2022-09-19
Payer: MEDICARE

## 2022-09-19 ENCOUNTER — OFFICE VISIT (OUTPATIENT)
Dept: CARDIOLOGY CLINIC | Age: 82
End: 2022-09-19
Payer: MEDICARE

## 2022-09-19 VITALS
HEIGHT: 60 IN | HEART RATE: 43 BPM | OXYGEN SATURATION: 95 % | DIASTOLIC BLOOD PRESSURE: 60 MMHG | SYSTOLIC BLOOD PRESSURE: 142 MMHG | WEIGHT: 153.8 LBS | BODY MASS INDEX: 30.19 KG/M2

## 2022-09-19 DIAGNOSIS — I44.2 COMPLETE HEART BLOCK (HCC): ICD-10-CM

## 2022-09-19 DIAGNOSIS — R55 SYNCOPE, UNSPECIFIED SYNCOPE TYPE: ICD-10-CM

## 2022-09-19 DIAGNOSIS — Z95.0 S/P CARDIAC PACEMAKER PROCEDURE: ICD-10-CM

## 2022-09-19 DIAGNOSIS — I44.2 THIRD DEGREE HEART BLOCK (HCC): Primary | ICD-10-CM

## 2022-09-19 DIAGNOSIS — I10 PRIMARY HYPERTENSION: ICD-10-CM

## 2022-09-19 DIAGNOSIS — E78.5 DYSLIPIDEMIA: ICD-10-CM

## 2022-09-19 DIAGNOSIS — Z95.2 HISTORY OF TRANSCATHETER AORTIC VALVE REPLACEMENT (TAVR): ICD-10-CM

## 2022-09-19 DIAGNOSIS — Z09 HOSPITAL DISCHARGE FOLLOW-UP: ICD-10-CM

## 2022-09-19 DIAGNOSIS — I35.0 AORTIC VALVE STENOSIS, ETIOLOGY OF CARDIAC VALVE DISEASE UNSPECIFIED: Primary | ICD-10-CM

## 2022-09-19 LAB
ALBUMIN SERPL-MCNC: 4.5 GM/DL (ref 3.4–5)
ALP BLD-CCNC: 84 IU/L (ref 40–129)
ALT SERPL-CCNC: 84 U/L (ref 10–40)
ANION GAP SERPL CALCULATED.3IONS-SCNC: 11 MMOL/L (ref 4–16)
AST SERPL-CCNC: 60 IU/L (ref 15–37)
BASOPHILS ABSOLUTE: 0 K/CU MM
BASOPHILS RELATIVE PERCENT: 0.4 % (ref 0–1)
BILIRUB SERPL-MCNC: 0.6 MG/DL (ref 0–1)
BUN BLDV-MCNC: 20 MG/DL (ref 6–23)
CALCIUM SERPL-MCNC: 9 MG/DL (ref 8.3–10.6)
CHLORIDE BLD-SCNC: 107 MMOL/L (ref 99–110)
CO2: 22 MMOL/L (ref 21–32)
CREAT SERPL-MCNC: 0.7 MG/DL (ref 0.6–1.1)
DIFFERENTIAL TYPE: ABNORMAL
EKG ATRIAL RATE: 67 BPM
EKG DIAGNOSIS: NORMAL
EKG Q-T INTERVAL: 520 MS
EKG QRS DURATION: 124 MS
EKG QTC CALCULATION (BAZETT): 429 MS
EKG R AXIS: -43 DEGREES
EKG T AXIS: 94 DEGREES
EKG VENTRICULAR RATE: 41 BPM
EOSINOPHILS ABSOLUTE: 0.1 K/CU MM
EOSINOPHILS RELATIVE PERCENT: 0.6 % (ref 0–3)
GFR AFRICAN AMERICAN: >60 ML/MIN/1.73M2
GFR NON-AFRICAN AMERICAN: >60 ML/MIN/1.73M2
GLUCOSE BLD-MCNC: 110 MG/DL (ref 70–99)
HCT VFR BLD CALC: 37.2 % (ref 37–47)
HEMOGLOBIN: 12.5 GM/DL (ref 12.5–16)
IMMATURE NEUTROPHIL %: 0.3 % (ref 0–0.43)
LYMPHOCYTES ABSOLUTE: 2.4 K/CU MM
LYMPHOCYTES RELATIVE PERCENT: 24 % (ref 24–44)
MCH RBC QN AUTO: 31.4 PG (ref 27–31)
MCHC RBC AUTO-ENTMCNC: 33.6 % (ref 32–36)
MCV RBC AUTO: 93.5 FL (ref 78–100)
MONOCYTES ABSOLUTE: 0.7 K/CU MM
MONOCYTES RELATIVE PERCENT: 6.8 % (ref 0–4)
NUCLEATED RBC %: 0 %
PDW BLD-RTO: 14 % (ref 11.7–14.9)
PLATELET # BLD: 207 K/CU MM (ref 140–440)
PMV BLD AUTO: 11.2 FL (ref 7.5–11.1)
POTASSIUM SERPL-SCNC: 4.3 MMOL/L (ref 3.5–5.1)
PRO-BNP: 3747 PG/ML
RBC # BLD: 3.98 M/CU MM (ref 4.2–5.4)
SEGMENTED NEUTROPHILS ABSOLUTE COUNT: 6.7 K/CU MM
SEGMENTED NEUTROPHILS RELATIVE PERCENT: 67.9 % (ref 36–66)
SODIUM BLD-SCNC: 140 MMOL/L (ref 135–145)
TOTAL IMMATURE NEUTOROPHIL: 0.03 K/CU MM
TOTAL NUCLEATED RBC: 0 K/CU MM
TOTAL PROTEIN: 6.4 GM/DL (ref 6.4–8.2)
TROPONIN T: <0.01 NG/ML
WBC # BLD: 9.9 K/CU MM (ref 4–10.5)

## 2022-09-19 PROCEDURE — 6370000000 HC RX 637 (ALT 250 FOR IP): Performed by: NURSE PRACTITIONER

## 2022-09-19 PROCEDURE — 84484 ASSAY OF TROPONIN QUANT: CPT

## 2022-09-19 PROCEDURE — 83880 ASSAY OF NATRIURETIC PEPTIDE: CPT

## 2022-09-19 PROCEDURE — C1898 LEAD, PMKR, OTHER THAN TRANS: HCPCS

## 2022-09-19 PROCEDURE — C1785 PMKR, DUAL, RATE-RESP: HCPCS

## 2022-09-19 PROCEDURE — 93000 ELECTROCARDIOGRAM COMPLETE: CPT | Performed by: NURSE PRACTITIONER

## 2022-09-19 PROCEDURE — 93010 ELECTROCARDIOGRAM REPORT: CPT | Performed by: INTERNAL MEDICINE

## 2022-09-19 PROCEDURE — 99285 EMERGENCY DEPT VISIT HI MDM: CPT

## 2022-09-19 PROCEDURE — APPNB60 APP NON BILLABLE TIME 46-60 MINS: Performed by: NURSE PRACTITIONER

## 2022-09-19 PROCEDURE — 1036F TOBACCO NON-USER: CPT | Performed by: NURSE PRACTITIONER

## 2022-09-19 PROCEDURE — 33208 INSRT HEART PM ATRIAL & VENT: CPT

## 2022-09-19 PROCEDURE — 93005 ELECTROCARDIOGRAM TRACING: CPT | Performed by: EMERGENCY MEDICINE

## 2022-09-19 PROCEDURE — G8417 CALC BMI ABV UP PARAM F/U: HCPCS | Performed by: NURSE PRACTITIONER

## 2022-09-19 PROCEDURE — 1090F PRES/ABSN URINE INCON ASSESS: CPT | Performed by: NURSE PRACTITIONER

## 2022-09-19 PROCEDURE — 6360000002 HC RX W HCPCS

## 2022-09-19 PROCEDURE — 2500000003 HC RX 250 WO HCPCS

## 2022-09-19 PROCEDURE — 1123F ACP DISCUSS/DSCN MKR DOCD: CPT | Performed by: NURSE PRACTITIONER

## 2022-09-19 PROCEDURE — 94761 N-INVAS EAR/PLS OXIMETRY MLT: CPT

## 2022-09-19 PROCEDURE — 99215 OFFICE O/P EST HI 40 MIN: CPT | Performed by: NURSE PRACTITIONER

## 2022-09-19 PROCEDURE — 6360000004 HC RX CONTRAST MEDICATION

## 2022-09-19 PROCEDURE — G8427 DOCREV CUR MEDS BY ELIG CLIN: HCPCS | Performed by: NURSE PRACTITIONER

## 2022-09-19 PROCEDURE — 71045 X-RAY EXAM CHEST 1 VIEW: CPT

## 2022-09-19 PROCEDURE — G0378 HOSPITAL OBSERVATION PER HR: HCPCS

## 2022-09-19 PROCEDURE — G8400 PT W/DXA NO RESULTS DOC: HCPCS | Performed by: NURSE PRACTITIONER

## 2022-09-19 PROCEDURE — 2580000003 HC RX 258: Performed by: NURSE PRACTITIONER

## 2022-09-19 PROCEDURE — 33208 INSRT HEART PM ATRIAL & VENT: CPT | Performed by: INTERNAL MEDICINE

## 2022-09-19 PROCEDURE — C1892 INTRO/SHEATH,FIXED,PEEL-AWAY: HCPCS

## 2022-09-19 PROCEDURE — 85025 COMPLETE CBC W/AUTO DIFF WBC: CPT

## 2022-09-19 PROCEDURE — 80053 COMPREHEN METABOLIC PANEL: CPT

## 2022-09-19 PROCEDURE — 2709999900 HC NON-CHARGEABLE SUPPLY

## 2022-09-19 PROCEDURE — 1111F DSCHRG MED/CURRENT MED MERGE: CPT | Performed by: NURSE PRACTITIONER

## 2022-09-19 RX ORDER — LANOLIN ALCOHOL/MO/W.PET/CERES
3 CREAM (GRAM) TOPICAL DAILY
Status: DISCONTINUED | OUTPATIENT
Start: 2022-09-19 | End: 2022-09-20 | Stop reason: HOSPADM

## 2022-09-19 RX ORDER — SODIUM CHLORIDE 0.9 % (FLUSH) 0.9 %
5-40 SYRINGE (ML) INJECTION EVERY 12 HOURS SCHEDULED
Status: DISCONTINUED | OUTPATIENT
Start: 2022-09-19 | End: 2022-09-20 | Stop reason: HOSPADM

## 2022-09-19 RX ORDER — TRAMADOL HYDROCHLORIDE 50 MG/1
50 TABLET ORAL EVERY 6 HOURS PRN
Status: DISCONTINUED | OUTPATIENT
Start: 2022-09-19 | End: 2022-09-20 | Stop reason: HOSPADM

## 2022-09-19 RX ORDER — FLUOXETINE 10 MG/1
10 CAPSULE ORAL DAILY
Status: DISCONTINUED | OUTPATIENT
Start: 2022-09-20 | End: 2022-09-20 | Stop reason: HOSPADM

## 2022-09-19 RX ORDER — ACETAMINOPHEN 325 MG/1
650 TABLET ORAL EVERY 4 HOURS PRN
Status: DISCONTINUED | OUTPATIENT
Start: 2022-09-19 | End: 2022-09-20 | Stop reason: HOSPADM

## 2022-09-19 RX ORDER — AMPICILLIN 500 MG/1
500 CAPSULE ORAL 4 TIMES DAILY
Status: ON HOLD | COMMUNITY
End: 2022-09-20 | Stop reason: HOSPADM

## 2022-09-19 RX ORDER — SODIUM CHLORIDE 9 MG/ML
INJECTION, SOLUTION INTRAVENOUS PRN
Status: DISCONTINUED | OUTPATIENT
Start: 2022-09-19 | End: 2022-09-20 | Stop reason: HOSPADM

## 2022-09-19 RX ORDER — MELOXICAM 15 MG/1
15 TABLET ORAL
Status: ON HOLD | COMMUNITY
End: 2022-09-20 | Stop reason: HOSPADM

## 2022-09-19 RX ORDER — ASPIRIN 81 MG/1
81 TABLET, CHEWABLE ORAL DAILY
Status: DISCONTINUED | OUTPATIENT
Start: 2022-09-20 | End: 2022-09-20 | Stop reason: HOSPADM

## 2022-09-19 RX ORDER — SODIUM CHLORIDE 0.9 % (FLUSH) 0.9 %
5-40 SYRINGE (ML) INJECTION PRN
Status: DISCONTINUED | OUTPATIENT
Start: 2022-09-19 | End: 2022-09-20 | Stop reason: HOSPADM

## 2022-09-19 RX ADMIN — TRAMADOL HYDROCHLORIDE 50 MG: 50 TABLET, COATED ORAL at 21:33

## 2022-09-19 RX ADMIN — SODIUM CHLORIDE, PRESERVATIVE FREE 10 ML: 5 INJECTION INTRAVENOUS at 21:33

## 2022-09-19 RX ADMIN — Medication 3 MG: at 21:33

## 2022-09-19 ASSESSMENT — PAIN SCALES - GENERAL
PAINLEVEL_OUTOF10: 0
PAINLEVEL_OUTOF10: 0

## 2022-09-19 ASSESSMENT — LIFESTYLE VARIABLES
HOW MANY STANDARD DRINKS CONTAINING ALCOHOL DO YOU HAVE ON A TYPICAL DAY: PATIENT DOES NOT DRINK
HOW OFTEN DO YOU HAVE A DRINK CONTAINING ALCOHOL: NEVER

## 2022-09-19 ASSESSMENT — PAIN - FUNCTIONAL ASSESSMENT: PAIN_FUNCTIONAL_ASSESSMENT: NONE - DENIES PAIN

## 2022-09-19 NOTE — PROGRESS NOTES
4 Eyes Skin Assessment     NAME:  Jeffrey Nguyễn  YOB: 1940  MEDICAL RECORD NUMBER:  [id number]    The patient is being assess for  Admission    I agree that 2 RN's have performed a thorough Head to Toe Skin Assessment on the patient. ALL assessment sites listed below have been assessed. Areas assessed by both nurses:    Head, Face, Ears, Shoulders, Back, Chest, Arms, Elbows, Hands, Sacrum. Buttock, Coccyx, Ischium, and Legs. Feet and Heels        Does the Patient have a Wound?  No noted wound(s)       Francisco Prevention initiated:  No   Wound Care Orders initiated:  No    Pressure Injury (Stage 3,4, Unstageable, DTI, NWPT, and Complex wounds) if present place referral/consult order under [de-identified] No    New and Established Ostomies if present place consult order under : No      Nurse 1 eSignature: Electronically signed by Lukasz Oneill RN on 9/19/22 at 6:29 PM EDT    **SHARE this note so that the co-signing nurse is able to place an eSignature**    Nurse 2 eSignature: {Esignature:[id number]}

## 2022-09-19 NOTE — PROGRESS NOTES
9/19/2022  Primary cardiologist: Dr. Zahra Padilla    CC:   Alvin Muñoz  is an established 78 y.o.  female here for a 6 month follow up    Follow-Up from Hospital (Was at the Dr's office and passed out. Brought to the hospital. 8/24/2022) and Dizziness (Syncope episodes with low HR. Pt states been going on for quite a while but the past week has been very bad./)        SUBJECTIVE/OBJECTIVE:  Alvin Muñoz is a 78 y.o. female with a history of VHD, SP TAVR, HTN, Dyslipidemia. HPI :   Alvin Muñoz reports that she has been having issues with fatiuge and syncope. Daughter with her today reports that she is very concerned and is worried about patient safety. Patient has been to [facility] ED for evaluation and had multiple EKG's and has not found anything wrong. Review of Systems   Constitutional: Positive for malaise/fatigue. Cardiovascular:  Positive for near-syncope and syncope. Negative for chest pain, cyanosis, dyspnea on exertion and leg swelling. Neurological:  Positive for dizziness. Vitals:    09/19/22 1119 09/19/22 1125   BP: (!) 142/60 (!) 142/60   Site: Left Upper Arm Left Upper Arm   Position: Sitting Sitting   Cuff Size: Medium Adult Medium Adult   Pulse: (!) 43 (!) 43   SpO2: 95%    Weight: 153 lb 12.8 oz (69.8 kg)    Height: 5' (1.524 m)      Patient-Reported Vitals 1/29/2021   Patient-Reported Weight 149lb   Patient-Reported Height 5ft   Patient-Reported Systolic 778   Patient-Reported Diastolic 71   Patient-Reported Pulse 75     Wt Readings from Last 3 Encounters:   09/19/22 153 lb 12.8 oz (69.8 kg)   05/17/22 152 lb 9.6 oz (69.2 kg)   12/17/21 150 lb (68 kg)     Body mass index is 30.04 kg/m². Physical Exam  Vitals reviewed. Constitutional:       General: She is not in acute distress. Appearance: Normal appearance. She is not ill-appearing. HENT:      Head: Atraumatic. Neck:      Vascular: No carotid bruit. Cardiovascular:      Rate and Rhythm: Bradycardia present. Rhythm irregular. Pulses: Normal pulses. Heart sounds: Normal heart sounds. No murmur heard. Pulmonary:      Effort: Pulmonary effort is normal. No respiratory distress. Breath sounds: Normal breath sounds. Musculoskeletal:         General: No swelling or deformity. Cervical back: Neck supple. No muscular tenderness. Neurological:      Mental Status: She is alert. Current Outpatient Medications   Medication Sig Dispense Refill    ampicillin (PRINCIPEN) 500 MG capsule Take 500 mg by mouth 4 times daily      mirabegron (MYRBETRIQ) 25 MG TB24 Take 25 mg by mouth      meloxicam (MOBIC) 15 MG tablet Take 15 mg by mouth      simvastatin (ZOCOR) 40 MG tablet Take 1 tablet by mouth nightly 90 tablet 3    acetaminophen (TYLENOL) 325 MG tablet Take 650 mg by mouth every 6 hours as needed for Pain      vitamin C (ASCORBIC ACID) 500 MG tablet Take 500 mg by mouth daily      vitamin D 25 MCG (1000 UT) CAPS Take by mouth      metoprolol tartrate (LOPRESSOR) 25 MG tablet Take 0.5 tablets by mouth 2 times daily 90 tablet 3    aspirin 81 MG tablet Take 81 mg by mouth daily      melatonin 3 MG TABS tablet Take 3 mg by mouth daily      oxybutynin (DITROPAN) 5 MG tablet Take 5 mg by mouth 2 times daily       Multiple Vitamins-Minerals (THERAPEUTIC MULTIVITAMIN-MINERALS) tablet Take 1 tablet by mouth daily      CALCIUM PO Take  by mouth daily. GLUCOSAMINE-CHONDROITIN PO Take  by mouth daily. Cyanocobalamin (VITAMIN B-12 PO) Take 2,500 mg by mouth daily       FLUoxetine (PROZAC) 10 MG tablet Take 10 mg by mouth daily. No current facility-administered medications for this visit. Echo 6/2022  Summary   This is a limited echocardiogram to asses TAVR. Left ventricular function is normal, EF is estimated at 55-60%. Normally prosthetic valve in aortic position with a mean gradient of 2mmHg, AT 85msec. Mild tricuspid regurgitation with RVSP of 32mmHg. No evidence of any pericardial effusion.     All pertinent data reviewed and discussed with patient       ASSESSMENT/PLAN:    Aortic valve stenosis, etiology of cardiac valve disease unspecified/ History of transcatheter aortic valve replacement (TAVR)  Stable per recent echo 6/2022    Bradycardia/ 3rd degree heart block/ syncope  Stop metoprolol- she did not take this morning  Patient to go to ED for evaluation by Dr. Keely Garcia for possible pacer placement. Reviewed patient EKG with Dr. Keely Garcia and he is aware patient is going to the ED. Primary hypertension  Controlled. On metoprolol. No reported adverse events or reported side effects from medication(s). She is stable on current dose. Dyslipidemia  Lipid panel reviewed from 1/2022 in care everywhere  Patient LDL is  at goal, HDL is at goal, triglycerides high  Continue Zocor (simvastatin), avoid grapefruit juice with Zocor  Discussed with the patient the need for exercise, low cholesterol diet, and compliance with medications. Tests ordered:  none    Follow-up  After pacemaker placement     Signed:  SHASHI Rey CNP, 9/19/2022, 11:40 AM    An electronic signature was used to authenticate this note. Please note this report has been partially produced using speech recognition software and may contain errors related to that system including errors in grammar, punctuation, and spelling, as well as words and phrases that may be inappropriate. If there are any questions or concerns please feel free to contact the dictating provider for clarification.

## 2022-09-19 NOTE — ED NOTES
[phone] paged ICU hospitalist     Dominick Bryson  09/19/22 [id number] ICU hospitalist returned call      Dominick Bryson  09/19/22 [id number]

## 2022-09-19 NOTE — CONSULTS
Electrophysiology Emergency ED Consult Note      Reason for consultation: Complete heart block    Chief complaint : Complete heart block    Referring physician:       Primary care physician: Fanny Mckay APRN - CNP      History of Present Illness:     Hussein Hutson is an 78year old female with a history of TAVR in 2020, hypertension, hyperlipidemia, vertigo. Patient presents with complaints of bradycardia, fatigue, weakness, and syncope. Patient reports that she has had 3 syncopal episodes over the last few months. She states that she will began to feel dizzy, lightheaded, and will feel like she is going to pass. She states that if she sits down she will not pass out. She was hospitalized 8/24/2022 after having a syncopal episode. She did have work up however could not find the cause of syncope. Patient has had multiple EKG's and have not found the cause of syncopal episodes. She reports that she has been having worsening fatigue also. She was seen today in the office and noted to be in a complete heart block. Patient had TAVR 2 years ago at [facility]. She is on metoprolol 12.5 mg BID. She did take ASA 81 mg this morning. Labs this admission K 4.3, creatinine 0.7, Na 140,   ECHO June 7 2022 EF 55-60%      Pastmedical history:   Past Medical History:   Diagnosis Date    Chest discomfort     Fatigue     H/O 24 hour EKG monitoring 01/06/2009 1/6/2009 - Baseline rhythm mechanism is normal sinus. Breif episodes of SVT & sinus tachycardia noted. No clinically significant arrhythmias seen. No untoward symptoms charted in patient's diary. H/O cardiovascular stress test 01/06/2009 1/6/2009 - Normal pattern of perfusion in all regions. LV is normal in size. No wall motion abnormality seen. LVSF is normal. Exercise capcity 8 METS. Exercise capacity is normal. No ECG changes.     H/O cardiovascular stress test 12/21/2020    EF 75% abnormal stress test H/O Doppler ultrasound 07/10/2018    carotid - bilateral mild disease    H/O echocardiogram 4/24/2012, 1/5/2010 4/24/2012 - LVSF is normal. EF = >55%. Impaired LV relaxation. Mild TR, pulmonary HTN, aortic valve calcification & valvular aortic stenosis. .    H/O echocardiogram 08/14/2017    EF55-60% mod-severe AS, mild AR, mild-mod MR    H/O echocardiogram 08/01/2019    EF 55-60%, normally functioning bioprosthetic valve in aortic position, Mild-Mod TR, Mild Pulm HTN    H/O echocardiogram 12/21/2020    EF 55-60% normally functioning bioprostethic valve mild to moderate tricuspid regurgitation mild pulmonary htn no evidence of pericardial effusion. History of blood transfusion     History of exercise stress test 03/10/2020    Treadmill, Physiological BP response to exercise. ETT non diagnostic due to baseline LBBB. Hx of cardiovascular stress test 08/30/2017    EF 60% normal study    Hx of Doppler echocardiogram 07/06/2018    EF 55-60%  mod AR, mild MR, mild to mod TR, and mild pulmonic regurg. Mild pulmonary htn. 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  She has never used smokeless tobacco. She reports that she does not drink alcohol and does not use drugs. Allergies   Allergen Reactions    Tape [Adhesive Tape]        No current facility-administered medications on file prior to encounter. Current Outpatient Medications on File Prior to Encounter   Medication Sig Dispense Refill    ampicillin (PRINCIPEN) 500 MG capsule Take 500 mg by mouth 4 times daily      mirabegron (MYRBETRIQ) 25 MG TB24 Take 25 mg by mouth      meloxicam (MOBIC) 15 MG tablet Take 15 mg by mouth      simvastatin (ZOCOR) 40 MG tablet Take 1 tablet by mouth nightly 90 tablet 3    acetaminophen (TYLENOL) 325 MG tablet Take 650 mg by mouth every 6 hours as needed for Pain      vitamin C (ASCORBIC ACID) 500 MG tablet Take 500 mg by mouth daily      vitamin D 25 MCG (1000 UT) CAPS Take by mouth      aspirin 81 MG tablet Take 81 mg by mouth daily      melatonin 3 MG TABS tablet Take 3 mg by mouth daily      oxybutynin (DITROPAN) 5 MG tablet Take 5 mg by mouth 2 times daily       Multiple Vitamins-Minerals (THERAPEUTIC MULTIVITAMIN-MINERALS) tablet Take 1 tablet by mouth daily      CALCIUM PO Take  by mouth daily. GLUCOSAMINE-CHONDROITIN PO Take  by mouth daily. Cyanocobalamin (VITAMIN B-12 PO) Take 2,500 mg by mouth daily       FLUoxetine (PROZAC) 10 MG tablet Take 10 mg by mouth daily. Review of Systems:   Review of Systems   Constitutional:  Positive for activity change and fatigue. Negative for chills and fever. HENT:  Negative for congestion, ear pain and tinnitus. Eyes:  Negative for photophobia, pain and visual disturbance. Respiratory:  Negative for cough, chest tightness, shortness of breath and wheezing. Cardiovascular:  Negative for chest pain, palpitations and leg swelling. Gastrointestinal:  Negative for abdominal pain, blood in stool, constipation, diarrhea, nausea and vomiting. Endocrine: Negative for cold intolerance and heat intolerance. Genitourinary:  Negative for dysuria, flank pain and hematuria.    Musculoskeletal:  Positive for arthralgias. Negative for back pain, myalgias and neck stiffness. Skin:  Negative for color change and rash. Allergic/Immunologic: Negative for food allergies. Neurological:  Positive for dizziness, syncope and light-headedness. Negative for numbness and headaches. Hematological:  Does not bruise/bleed easily. Psychiatric/Behavioral:  Negative for agitation, behavioral problems and confusion. Examination:      Vitals:    09/19/22 1309 09/19/22 1312   BP:  (!) 160/73   Pulse: (!) 45 (!) 40   Resp: 18 14   Temp:  98.3 °F (36.8 °C)   TempSrc:  Oral   SpO2: 98% 98%   Weight: 150 lb (68 kg)    Height: 5' (1.524 m)         Body mass index is 29.29 kg/m². Physical Exam  Constitutional:       General: She is not in acute distress. Appearance: She is not ill-appearing or diaphoretic. HENT:      Head: Normocephalic and atraumatic. Right Ear: External ear normal.      Left Ear: External ear normal.      Nose: No congestion. Mouth/Throat:      Mouth: Mucous membranes are moist.   Eyes:      Extraocular Movements: Extraocular movements intact. Conjunctiva/sclera: Conjunctivae normal.      Pupils: Pupils are equal, round, and reactive to light. Cardiovascular:      Rate and Rhythm: Regular rhythm. Bradycardia present. Heart sounds: Murmur (grade 2/6 systolic murmur) heard. Pulmonary:      Effort: Pulmonary effort is normal. No respiratory distress. Breath sounds: Normal breath sounds. No wheezing or rhonchi. Abdominal:      General: Abdomen is flat. Bowel sounds are normal. There is no distension. Palpations: Abdomen is soft. Tenderness: There is no abdominal tenderness. Musculoskeletal:         General: No tenderness. Cervical back: Normal range of motion. No tenderness. Right lower leg: No edema. Left lower leg: No edema. Skin:     General: Skin is warm. Neurological:      General: No focal deficit present.       Mental Status: She is alert and oriented to person, place, and time. Cranial Nerves: No cranial nerve deficit. Psychiatric:         Mood and Affect: Mood normal.             CBC:   Lab Results   Component Value Date/Time    WBC 6.11 12/12/2018 12:00 AM    HGB 12.6 12/12/2018 12:00 AM    HCT 38.3 12/12/2018 12:00 AM     12/12/2018 12:00 AM     Lipids:  Lab Results   Component Value Date    CHOL 156 01/19/2021    TRIG 109 01/19/2021    HDL 53 01/19/2021    LDLCALC 83 01/19/2021    LDLDIRECT 114 (H) 08/20/2018     PT/INR: No results found for: INR     BMP:    Lab Results   Component Value Date     12/12/2018    K 3.8 12/12/2018     12/12/2018    CO2 25 12/12/2018    BUN 16 12/12/2018     CMP:   Lab Results   Component Value Date    AST 22 11/07/2018    PROT 6.1 (L) 08/20/2018    BILITOT 0.5 11/07/2018    ALKPHOS 65 11/07/2018     TSH:  No results found for: TSH, T4    EKGINTERPRETATION - EKG Interpretation:        IMPRESSION / RECOMMENDATIONS:     Complete heart block  Syncope  AS sp TAVR  HTN    Patient is in complete heart block and having syncope. Patient has no reversible causes for complete heart block at this time recommend pacemaker. The risks including, but not limited to, the risks of vascular injury, bleeding, infection, device malfunction, lead dislodgement, radiation exposure, injury to cardiac and surrounding structures (including pneumothorax), stroke, myocardial infarction and death were discussed in detail. The patient opted to proceed with the device implantation. Thanks again for allowing me to participate in care of this patient. Please call me if you have any questions. With best regards. Kristofer Velazquez MD, 9/19/2022 1:56 PM     Please note this report has been partially produced using speech recognition software and may contain errors related to that system including errors in grammar, punctuation, and spelling, as well as words and phrases that may be inappropriate.  If there are any questions or concerns please feel free to contact the dictating provider for clarification.

## 2022-09-19 NOTE — OP NOTE
[facility]     Electrophysiology Procedure Note       Date of Procedure: 9/19/2022  Patient's Name: Julieta Banks  YOB: 1940   Medical Record Number: [id number]    Procedure Performed by: Emma Fernandez MD    Procedures performed:  Insertion of right ventricular pacing lead under fluoroscopy. Insertion of right atrial lead under fluoroscopy  Insertion of a Dual chamber Pacemaker. Electronic analysis of lead and device. IV sedation. Selective venography of left upper extremity. Sedation : Versed, Fentanyl    Blood loss : 20 cc    Indication of the procedure: COMPLETE HEART BLOCK    Brief History:      Julieta Banks is a 78 y.o. female with syncope with complete heart block in escape rhythm here for pacemaker implantation       Details of procedure: The patient was brought to the electrophysiology laboratory in stable condition. The patient was in a fasting and non-sedated state. The risks, benefits and alternatives of the procedure were discussed with the patient. The risks including, but not limited to, the risks of vascular injury, bleeding, infection, device malfunction, lead dislodgement, radiation exposure, injury to cardiac and surrounding structures (including pneumothorax), stroke, myocardial infarction and death were discussed in detail. The patient opted to proceed with the device implantation. Written informed consent was signed and placed in the chart. Prophylactic antibiotic was given. The patient was prepped and draped in a sterile fashion. A timeout protocol was completed to identify the patient and the procedure being performed. IV sedation was provided with IV Versed, Fentanyl. Left upper extremity venogram was obtained to assess the patency of the subclavian vein and for access under fluoroscopic guidance. An incision was made in the left pectoral area after administration of lidocaine.  Using electrocautery and blunt dissection, a pocket was created. Central venous access into the left subclavian vein was obtained using the modified Seldinger technique. After central venous access was obtained, a sheath was placed in the vein. Temporary RV pacemaker wire was placed in the RV under fluoroscopy from 6 F femoral venous sheath (which was placed using US guided seldinger technique. Temporary pacer was placed as patient is in complete heart block and we did not want to loose capture for the heart if the leads malfunctioned during generator change. A right ventricular lead was advanced into position in the RV septum under fluoroscopic guidance and using a series of curved stylets. The lead was actively fixated. After confirming appropriate function, the sheath was split and removed. The lead was secured to the underlying tissue using suture material. A new sheath was advanced over a second previously placed wire into the vein. The atrial lead was advanced to the right atrial appendage and actively fixated under fluoroscopic guidance. After confirming appropriate function, the sheath was split and removed. The lead was secured to the underlying tissue using suture. The pocket was irrigated with an antibiotic solution. The leads were then connected to the new pulse generator which was then placed into the cleaned pocket. The pocket was then closed in two separate subcutaneous layers using 2-0 & 2-0 Vicryl and subcuticular layer using 4-0 Vicryl. The skin was covered with Steri-Strips and dressing. All sponge and needle counts were reported as correct at the end of the procedure. The patient tolerated the procedure well and there were no complications. Patient was transported to the holding area in stable condition. Lead and device information:         Plan:     The patient will have usual post-implant care, including chest x-ray, and antibiotic therapy and interrogation of the device.       Electronically signed by Lizz Sommer Amanda Vance MD on 9/19/2022 at 4:15 PM

## 2022-09-19 NOTE — ED PROVIDER NOTES
Emergency Department Encounter    Patient: Marcial Isaacs  MRN: [id number]  : 1940  Date of Evaluation: 2022  ED Provider:  Charles Barrow MD    Triage Chief Complaint:   Bradycardia Lizzeth Car to cardiologist today and found pt in 3rd degree block. Sent here to receive pacemaker)    [place]:  Marcial Isaacs is a 78 y.o. female that presents with concern for heart block and fatigue. Was at the cardiology office today, she had called today because she has been feeling so poorly yesterday and today, just generally weak and unwell. No chest pain. No shortness of breath. Found to be in complete heart block and was sent here. Does note that had been seen at the hospital, appears to be [facility] ED, on , been having episodes of syncope, episodes of low heart rate. This past week has been the worst.  No nausea or vomiting. No diarrhea. No medication changes. Does have a history of VHD status post TAVR, hypertension and hyperlipidemia    ROS - see HPI, below listed is current ROS at time of my eval:  10 systems reviewed negative except as above    Past Medical History:   Diagnosis Date    Chest discomfort     Fatigue     H/O 24 hour EKG monitoring 2009 - Baseline rhythm mechanism is normal sinus. Breif episodes of SVT & sinus tachycardia noted. No clinically significant arrhythmias seen. No untoward symptoms charted in patient's diary. H/O cardiovascular stress test 2009 - Normal pattern of perfusion in all regions. LV is normal in size. No wall motion abnormality seen. LVSF is normal. Exercise capcity 8 METS. Exercise capacity is normal. No ECG changes. H/O cardiovascular stress test 2020    EF 75% abnormal stress test    H/O Doppler ultrasound 07/10/2018    carotid - bilateral mild disease    H/O echocardiogram 2012, 2010 - LVSF is normal. EF = >55%. Impaired LV relaxation.  Mild TR, pulmonary HTN, aortic valve calcification & valvular aortic stenosis. .    H/O echocardiogram 08/14/2017    EF55-60% mod-severe AS, mild AR, mild-mod MR    H/O echocardiogram 08/01/2019    EF 55-60%, normally functioning bioprosthetic valve in aortic position, Mild-Mod TR, Mild Pulm HTN    H/O echocardiogram 12/21/2020    EF 55-60% normally functioning bioprostethic valve mild to moderate tricuspid regurgitation mild pulmonary htn no evidence of pericardial effusion. History of blood transfusion     History of exercise stress test 03/10/2020    Treadmill, Physiological BP response to exercise. ETT non diagnostic due to baseline LBBB. Hx of cardiovascular stress test 08/30/2017    EF 60% normal study    Hx of Doppler echocardiogram 07/06/2018    EF 55-60%  mod AR, mild MR, mild to mod TR, and mild pulmonic regurg. Mild pulmonary htn. 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    [place]    HYSTERECTOMY (CERVIX STATUS UNKNOWN)      OTHER SURGICAL HISTORY Left     Ear surgery     Family History   Problem Relation Age of Onset    Other Father         appendix ruptured    Heart Failure Mother         CHF    Hypertension Mother     Cancer Sister     Atrial Fibrillation Sister     Atrial Fibrillation Sister     Atrial Fibrillation Sister      Social History     Socioeconomic History    Marital status:       Spouse name: Not on file    Number of children: Not on file    Years of education: Not on file    Highest education level: Not on file   Occupational History    Occupation: Retired   Tobacco Use    Smoking status: Never    Smokeless tobacco: Never   Vaping Use    Vaping Use: Never used   Substance and Sexual Activity    Alcohol use: No     Comment: CAFFEINE: 2 cups coffee daily    Drug use: No    Sexual activity: Not on file   Other Topics Concern    Not on file   Social History Narrative    Not on file     Social Determinants of Health     Financial Resource Strain: Not on file   Food Insecurity: Not on file   Transportation Needs: Not on file   Physical Activity: Not on file   Stress: Not on file   Social Connections: Not on file   Intimate Partner Violence: Not on file   Housing Stability: Not on file     No current facility-administered medications for this encounter. Current Outpatient Medications   Medication Sig Dispense Refill    ampicillin (PRINCIPEN) 500 MG capsule Take 500 mg by mouth 4 times daily      mirabegron (MYRBETRIQ) 25 MG TB24 Take 25 mg by mouth      meloxicam (MOBIC) 15 MG tablet Take 15 mg by mouth      simvastatin (ZOCOR) 40 MG tablet Take 1 tablet by mouth nightly 90 tablet 3    acetaminophen (TYLENOL) 325 MG tablet Take 650 mg by mouth every 6 hours as needed for Pain      vitamin C (ASCORBIC ACID) 500 MG tablet Take 500 mg by mouth daily      vitamin D 25 MCG (1000 UT) CAPS Take by mouth      aspirin 81 MG tablet Take 81 mg by mouth daily      melatonin 3 MG TABS tablet Take 3 mg by mouth daily      oxybutynin (DITROPAN) 5 MG tablet Take 5 mg by mouth 2 times daily       Multiple Vitamins-Minerals (THERAPEUTIC MULTIVITAMIN-MINERALS) tablet Take 1 tablet by mouth daily      CALCIUM PO Take  by mouth daily. GLUCOSAMINE-CHONDROITIN PO Take  by mouth daily. Cyanocobalamin (VITAMIN B-12 PO) Take 2,500 mg by mouth daily       FLUoxetine (PROZAC) 10 MG tablet Take 10 mg by mouth daily.        Allergies   Allergen Reactions    Tape [Adhesive Tape]        Nursing Notes Reviewed    Physical Exam:  ED Triage Vitals   Enc Vitals Group      BP 09/19/22 1312 (!) 160/73      Heart Rate 09/19/22 1309 (!) 45      Resp 09/19/22 1309 18      Temp 09/19/22 1312 98.3 °F (36.8 °C)      Temp Source 09/19/22 1312 Oral      SpO2 09/19/22 1309 98 %      Weight 09/19/22 1309 150 lb (68 kg)      Height 09/19/22 1309 5' (1.524 m)      Head Circumference --       Peak Flow --       Pain Score --       Pain Loc --       Pain Edu? --       Excl. in [address]? --            My pulse ox interpretation is - normal    General appearance:  No acute distress. Skin:  Warm. Dry. Eye:  Extraocular movements intact. Ears, nose, mouth and throat:  Oral mucosa moist   Neck:  Trachea midline. Extremity:  No swelling. Normal ROM     Heart:  bradycardia, rate 40bpm, normal S1 & S2, +murmur  Perfusion:  intact  Respiratory:  Lungs clear to auscultation bilaterally. Respirations nonlabored. Abdominal:   Soft. Nontender. Non distended. Neurological:  Alert and oriented . Psychiatric:  Appropriate    I have reviewed and interpreted all of the currently available lab results from this visit (if applicable):  Results for orders placed or performed during the hospital encounter of 09/19/22   EKG 12 Lead   Result Value Ref Range    Ventricular Rate 41 BPM    Atrial Rate 67 BPM    QRS Duration 124 ms    Q-T Interval 520 ms    QTc Calculation (Bazett) 429 ms    R Axis -43 degrees    T Axis 94 degrees    Diagnosis       Sinus rhythm with complete heart block and Wide QRS rhythm  Left axis deviation  Left bundle branch block  Abnormal ECG  No previous ECGs available        Radiographs (if obtained):  Radiologist's Report Reviewed:  No results found. EKG (if obtained): (All EKG's are interpreted by myself in the absence of a cardiologist)  Third-degree heart block, rate of 41 bpm.  Left bundle branch block. Compared to previous from 8/31/2022, more bradycardic, third-degree block      MDM:  75-year-old female with history as above presents with concern for fatigue and general weakness, found to be in third-degree heart block. Confirmed here on EKG, her blood pressure is normal and she is currently asymptomatic laying on the cot.   We will start IV, I have had pads placed on her as a precaution, I am paging cardiology, labs have been ordered        Clinical Impression:  1. Third degree heart block ([address]      Disposition referral (if applicable):  No follow-up provider specified. Disposition medications (if applicable):  New Prescriptions    No medications on file     ED Provider Disposition Time  DISPOSITION        Comment: Please note this report has been produced using speech recognition software and may contain errors related to that system including errors in grammar, punctuation, and spelling, as well as words and phrases that may be inappropriate. Efforts were made to edit the dictations. Gerhardt Sawyer, MD  09/19/22 5006        Electrophysiology NP, Jose at bedside, will get patient in for temp pacemaker, plan for admission. Should hopefully go upstairs soon. Total critical care time today provided was 32 minutes. This excludes seperately billable procedure. Critical care time provided for third degree AV block that required close evaluation and/or intervention with concern for patient decompensation.        Gerhardt Sawyer, MD  09/19/22 [id number]

## 2022-09-20 VITALS
OXYGEN SATURATION: 96 % | DIASTOLIC BLOOD PRESSURE: 78 MMHG | RESPIRATION RATE: 18 BRPM | HEART RATE: 68 BPM | BODY MASS INDEX: 29.45 KG/M2 | HEIGHT: 60 IN | SYSTOLIC BLOOD PRESSURE: 153 MMHG | TEMPERATURE: 97.8 F | WEIGHT: 150 LBS

## 2022-09-20 PROCEDURE — 2580000003 HC RX 258: Performed by: NURSE PRACTITIONER

## 2022-09-20 PROCEDURE — 96365 THER/PROPH/DIAG IV INF INIT: CPT

## 2022-09-20 PROCEDURE — 94761 N-INVAS EAR/PLS OXIMETRY MLT: CPT

## 2022-09-20 PROCEDURE — G0378 HOSPITAL OBSERVATION PER HR: HCPCS

## 2022-09-20 PROCEDURE — 96366 THER/PROPH/DIAG IV INF ADDON: CPT

## 2022-09-20 PROCEDURE — 6360000002 HC RX W HCPCS: Performed by: NURSE PRACTITIONER

## 2022-09-20 PROCEDURE — 93280 PM DEVICE PROGR EVAL DUAL: CPT | Performed by: INTERNAL MEDICINE

## 2022-09-20 PROCEDURE — 6370000000 HC RX 637 (ALT 250 FOR IP): Performed by: NURSE PRACTITIONER

## 2022-09-20 RX ORDER — TRAMADOL HYDROCHLORIDE 50 MG/1
50 TABLET ORAL EVERY 8 HOURS PRN
Qty: 9 TABLET | Refills: 0 | Status: SHIPPED | OUTPATIENT
Start: 2022-09-20 | End: 2022-09-23

## 2022-09-20 RX ADMIN — FLUOXETINE 10 MG: 10 CAPSULE ORAL at 09:46

## 2022-09-20 RX ADMIN — CEFAZOLIN 2000 MG: 2 INJECTION, POWDER, FOR SOLUTION INTRAMUSCULAR; INTRAVENOUS at 00:39

## 2022-09-20 RX ADMIN — SODIUM CHLORIDE, PRESERVATIVE FREE 10 ML: 5 INJECTION INTRAVENOUS at 09:45

## 2022-09-20 RX ADMIN — ACETAMINOPHEN 650 MG: 325 TABLET ORAL at 00:34

## 2022-09-20 RX ADMIN — TRAMADOL HYDROCHLORIDE 50 MG: 50 TABLET, COATED ORAL at 16:05

## 2022-09-20 RX ADMIN — ASPIRIN 81 MG CHEWABLE TABLET 81 MG: 81 TABLET CHEWABLE at 09:46

## 2022-09-20 RX ADMIN — TRAMADOL HYDROCHLORIDE 50 MG: 50 TABLET, COATED ORAL at 09:46

## 2022-09-20 RX ADMIN — CEFAZOLIN 2000 MG: 2 INJECTION, POWDER, FOR SOLUTION INTRAMUSCULAR; INTRAVENOUS at 09:43

## 2022-09-20 RX ADMIN — ACETAMINOPHEN 650 MG: 325 TABLET ORAL at 14:37

## 2022-09-20 RX ADMIN — TRAMADOL HYDROCHLORIDE 50 MG: 50 TABLET, COATED ORAL at 03:35

## 2022-09-20 ASSESSMENT — PAIN DESCRIPTION - ONSET: ONSET: ON-GOING

## 2022-09-20 ASSESSMENT — PAIN DESCRIPTION - DESCRIPTORS
DESCRIPTORS: ACHING

## 2022-09-20 ASSESSMENT — PAIN SCALES - GENERAL
PAINLEVEL_OUTOF10: 2
PAINLEVEL_OUTOF10: 5
PAINLEVEL_OUTOF10: 5
PAINLEVEL_OUTOF10: 0
PAINLEVEL_OUTOF10: 3
PAINLEVEL_OUTOF10: 3
PAINLEVEL_OUTOF10: 4
PAINLEVEL_OUTOF10: 2

## 2022-09-20 ASSESSMENT — ENCOUNTER SYMPTOMS
CONSTIPATION: 0
NAUSEA: 0
ABDOMINAL PAIN: 0
COUGH: 0
VOMITING: 0
EYE PAIN: 0
SHORTNESS OF BREATH: 0
CHEST TIGHTNESS: 0
BLOOD IN STOOL: 0
DIARRHEA: 0
WHEEZING: 0
PHOTOPHOBIA: 0
BACK PAIN: 0
COLOR CHANGE: 0

## 2022-09-20 ASSESSMENT — PAIN DESCRIPTION - LOCATION
LOCATION: CHEST
LOCATION: HEAD
LOCATION: SHOULDER
LOCATION: CHEST

## 2022-09-20 ASSESSMENT — PAIN SCALES - WONG BAKER
WONGBAKER_NUMERICALRESPONSE: 0
WONGBAKER_NUMERICALRESPONSE: 0

## 2022-09-20 ASSESSMENT — PAIN DESCRIPTION - ORIENTATION
ORIENTATION: LEFT
ORIENTATION: MID
ORIENTATION: LEFT
ORIENTATION: LEFT

## 2022-09-20 ASSESSMENT — PAIN - FUNCTIONAL ASSESSMENT
PAIN_FUNCTIONAL_ASSESSMENT: ACTIVITIES ARE NOT PREVENTED

## 2022-09-20 ASSESSMENT — PAIN DESCRIPTION - PAIN TYPE: TYPE: SURGICAL PAIN

## 2022-09-20 ASSESSMENT — PAIN DESCRIPTION - FREQUENCY: FREQUENCY: INTERMITTENT

## 2022-09-20 NOTE — PLAN OF CARE
Problem: Discharge Planning  Goal: Discharge to home or other facility with appropriate resources  Outcome: Completed     Problem: Skin/Tissue Integrity  Goal: Absence of new skin breakdown  Description: 1. Monitor for areas of redness and/or skin breakdown  2. Assess vascular access sites hourly  3. Every 4-6 hours minimum:  Change oxygen saturation probe site  4. Every 4-6 hours:  If on nasal continuous positive airway pressure, respiratory therapy assess nares and determine need for appliance change or resting period.   Outcome: Completed     Problem: Safety - Adult  Goal: Free from fall injury  Outcome: Completed     Problem: ABCDS Injury Assessment  Goal: Absence of physical injury  Outcome: Completed     Problem: Pain  Goal: Verbalizes/displays adequate comfort level or baseline comfort level  Outcome: Completed

## 2022-09-20 NOTE — H&P
Electrophysiology h&P Note        Reason for consultation: Complete heart block     Chief complaint : Complete heart block     Referring physician:         Primary care physician: SHASHI Belcher - CNP        History of Present Illness:      Josefina Telles is an 78year old female with a history of TAVR in 2020, hypertension, hyperlipidemia, vertigo. Patient presents with complaints of bradycardia, fatigue, weakness, and syncope. Patient reports that she has had 3 syncopal episodes over the last few months. She states that she will began to feel dizzy, lightheaded, and will feel like she is going to pass. She states that if she sits down she will not pass out. She was hospitalized 8/24/2022 after having a syncopal episode. She did have work up however could not find the cause of syncope. Patient has had multiple EKG's and have not found the cause of syncopal episodes. She reports that she has been having worsening fatigue also. She was seen today in the office and noted to be in a complete heart block. Patient had TAVR 2 years ago at [facility]. She is on metoprolol 12.5 mg BID. She did take ASA 81 mg this morning. Labs this admission K 4.3, creatinine 0.7, Na 140,   ECHO June 7 2022 EF 55-60%        Pastmedical history:   Past Medical History        Past Medical History:   Diagnosis Date    Chest discomfort      Fatigue      H/O 24 hour EKG monitoring 01/06/2009 1/6/2009 - Baseline rhythm mechanism is normal sinus. Breif episodes of SVT & sinus tachycardia noted. No clinically significant arrhythmias seen. No untoward symptoms charted in patient's diary. H/O cardiovascular stress test 01/06/2009 1/6/2009 - Normal pattern of perfusion in all regions. LV is normal in size. No wall motion abnormality seen. LVSF is normal. Exercise capcity 8 METS. Exercise capacity is normal. No ECG changes.     H/O cardiovascular stress test 12/21/2020     EF 75% abnormal stress test    H/O Doppler ultrasound 07/10/2018     carotid - bilateral mild disease    H/O echocardiogram 4/24/2012, 1/5/2010 4/24/2012 - LVSF is normal. EF = >55%. Impaired LV relaxation. Mild TR, pulmonary HTN, aortic valve calcification & valvular aortic stenosis. .    H/O echocardiogram 08/14/2017     EF55-60% mod-severe AS, mild AR, mild-mod MR    H/O echocardiogram 08/01/2019     EF 55-60%, normally functioning bioprosthetic valve in aortic position, Mild-Mod TR, Mild Pulm HTN    H/O echocardiogram 12/21/2020     EF 55-60% normally functioning bioprostethic valve mild to moderate tricuspid regurgitation mild pulmonary htn no evidence of pericardial effusion. History of blood transfusion      History of exercise stress test 03/10/2020     Treadmill, Physiological BP response to exercise. ETT non diagnostic due to baseline LBBB. Hx of cardiovascular stress test 08/30/2017     EF 60% normal study    Hx of Doppler echocardiogram 07/06/2018     EF 55-60%  mod AR, mild MR, mild to mod TR, and mild pulmonic regurg. Mild pulmonary htn. Hyperlipemia      Hypertension      Malaise and fatigue      Osteoarthritis      Restless leg      S/P TAVR (transcatheter aortic valve replacement) 09/24/2018     [place]    Sleep disorder      Spinal stenosis      VHD (valvular heart disease)              Surgical history :   Past Surgical History         Past Surgical History:   Procedure Laterality Date    ANOMALOUS VENOUS RETURN REPAIR   09/24/2018     [place]    HYSTERECTOMY (CERVIX STATUS UNKNOWN)        OTHER SURGICAL HISTORY Left       Ear surgery            Family history:   Family History         Family History   Problem Relation Age of Onset    Other Father           appendix ruptured    Heart Failure Mother           CHF    Hypertension Mother      Cancer Sister      Atrial Fibrillation Sister      Atrial Fibrillation Sister      Atrial Fibrillation Sister                    Social history :  reports that she has never smoked.  She has never used smokeless tobacco. She reports that she does not drink alcohol and does not use drugs. Allergies   Allergen Reactions    Tape [Adhesive Tape]           No current facility-administered medications on file prior to encounter. Current Outpatient Medications on File Prior to Encounter   Medication Sig Dispense Refill    ampicillin (PRINCIPEN) 500 MG capsule Take 500 mg by mouth 4 times daily        mirabegron (MYRBETRIQ) 25 MG TB24 Take 25 mg by mouth        meloxicam (MOBIC) 15 MG tablet Take 15 mg by mouth        simvastatin (ZOCOR) 40 MG tablet Take 1 tablet by mouth nightly 90 tablet 3    acetaminophen (TYLENOL) 325 MG tablet Take 650 mg by mouth every 6 hours as needed for Pain        vitamin C (ASCORBIC ACID) 500 MG tablet Take 500 mg by mouth daily        vitamin D 25 MCG (1000 UT) CAPS Take by mouth        aspirin 81 MG tablet Take 81 mg by mouth daily        melatonin 3 MG TABS tablet Take 3 mg by mouth daily        oxybutynin (DITROPAN) 5 MG tablet Take 5 mg by mouth 2 times daily         Multiple Vitamins-Minerals (THERAPEUTIC MULTIVITAMIN-MINERALS) tablet Take 1 tablet by mouth daily        CALCIUM PO Take  by mouth daily. GLUCOSAMINE-CHONDROITIN PO Take  by mouth daily. Cyanocobalamin (VITAMIN B-12 PO) Take 2,500 mg by mouth daily         FLUoxetine (PROZAC) 10 MG tablet Take 10 mg by mouth daily. Review of Systems:   Review of Systems   Constitutional:  Positive for activity change and fatigue. Negative for chills and fever. HENT:  Negative for congestion, ear pain and tinnitus. Eyes:  Negative for photophobia, pain and visual disturbance. Respiratory:  Negative for cough, chest tightness, shortness of breath and wheezing. Cardiovascular:  Negative for chest pain, palpitations and leg swelling. Gastrointestinal:  Negative for abdominal pain, blood in stool, constipation, diarrhea, nausea and vomiting.    Endocrine: Negative for cold intolerance and heat intolerance. Genitourinary:  Negative for dysuria, flank pain and hematuria. Musculoskeletal:  Positive for arthralgias. Negative for back pain, myalgias and neck stiffness. Skin:  Negative for color change and rash. Allergic/Immunologic: Negative for food allergies. Neurological:  Positive for dizziness, syncope and light-headedness. Negative for numbness and headaches. Hematological:  Does not bruise/bleed easily. Psychiatric/Behavioral:  Negative for agitation, behavioral problems and confusion. Examination:       Vitals        Vitals:     09/19/22 1309 09/19/22 1312   BP:   (!) 160/73   Pulse: (!) 45 (!) 40   Resp: 18 14   Temp:   98.3 °F (36.8 °C)   TempSrc:   Oral   SpO2: 98% 98%   Weight: 150 lb (68 kg)     Height: 5' (1.524 m)              Body mass index is 29.29 kg/m². Physical Exam  Constitutional:       General: She is not in acute distress. Appearance: She is not ill-appearing or diaphoretic. HENT:      Head: Normocephalic and atraumatic. Right Ear: External ear normal.      Left Ear: External ear normal.      Nose: No congestion. Mouth/Throat:      Mouth: Mucous membranes are moist.   Eyes:      Extraocular Movements: Extraocular movements intact. Conjunctiva/sclera: Conjunctivae normal.      Pupils: Pupils are equal, round, and reactive to light. Cardiovascular:      Rate and Rhythm: Regular rhythm. Bradycardia present. Heart sounds: Murmur (grade 2/6 systolic murmur) heard. Pulmonary:      Effort: Pulmonary effort is normal. No respiratory distress. Breath sounds: Normal breath sounds. No wheezing or rhonchi. Abdominal:      General: Abdomen is flat. Bowel sounds are normal. There is no distension. Palpations: Abdomen is soft. Tenderness: There is no abdominal tenderness. Musculoskeletal:         General: No tenderness. Cervical back: Normal range of motion. No tenderness.       Right lower leg: partially produced using speech recognition software and may contain errors related to that system including errors in grammar, punctuation, and spelling, as well as words and phrases that may be inappropriate. If there are any questions or concerns please feel free to contact the dictating provider for clarification.                 Initially patient was being admitted to hospitalist service but later request was for us to admit so I am doing h&p

## 2022-09-20 NOTE — DISCHARGE SUMMARY
[facility]  Discharge Summary    Gerald Yeung  :  1940  MRN:  [id number]    Admit date:  2022  Discharge date:      Admitting Physician:  Radha Penn MD    Discharge Diagnoses:     1. SP Dual Chamber pacemaker    Admission Condition:  fair    Discharged Condition:  good    Hospital Course:   Patient with hx of complete heart block presented for implantation of dual chamber pacemaker. Patient tolerated the procedure well. Observed overnight. Patient device area was clean, no hematoma and no tenderness. Patient device interrogation was stable. CXR confirmed placement of device with no complications. Patient stable for discharge with out patient follow up. Current Discharge Medication List             Details   traMADol (ULTRAM) 50 MG tablet Take 1 tablet by mouth every 8 hours as needed for Pain for up to 3 days. Qty: 9 tablet, Refills: 0    Comments: Reduce doses taken as pain becomes manageable  Associated Diagnoses: S/P cardiac pacemaker procedure                Details   simvastatin (ZOCOR) 40 MG tablet Take 1 tablet by mouth nightly  Qty: 90 tablet, Refills: 3    Comments: Requesting 1 year supply      acetaminophen (TYLENOL) 325 MG tablet Take 650 mg by mouth every 6 hours as needed for Pain      vitamin C (ASCORBIC ACID) 500 MG tablet Take 500 mg by mouth daily      vitamin D 25 MCG (1000 UT) CAPS Take by mouth      aspirin 81 MG tablet Take 81 mg by mouth daily      melatonin 3 MG TABS tablet Take 3 mg by mouth daily      oxybutynin (DITROPAN) 5 MG tablet Take 5 mg by mouth 2 times daily       Multiple Vitamins-Minerals (THERAPEUTIC MULTIVITAMIN-MINERALS) tablet Take 1 tablet by mouth daily      CALCIUM PO Take  by mouth daily. GLUCOSAMINE-CHONDROITIN PO Take  by mouth daily. FLUoxetine (PROZAC) 10 MG tablet Take 10 mg by mouth daily.              Consults:  none    Discharge Exam:  BP (!) 144/75   Pulse 63   Temp 98.1 °F (36.7 °C) (Oral)   Resp 18   Ht 5' (1.524 m)   Wt 150 lb (68 kg)   SpO2 92%   BMI 29.29 kg/m²   General appearance: alert, appears stated age, and cooperative  Head: Normocephalic, without obvious abnormality, atraumatic  Lungs: clear to auscultation bilaterally  Heart: regular rate and rhythm, S1, S2 normal, no murmur, click, rub or gallop  Extremities: extremities normal, atraumatic, no cyanosis or edema  Pulses: 2+ and symmetric  Skin: Skin color, texture, turgor normal. No rashes or lesions. Left upper chest dressing clean dry intact. Minimal bruising. No hematoma  Neurologic: Grossly normal    Disposition:   home    Signed:  SHASHI Reza CNP  9/20/2022, 8:36 AM     Device Assessment:      The device is Medtronic pacemaker - Dual Chamber chamber      MRI Compatible : yes    Device interrogation was performed. Mode: DDDR     Sensing is normal. Impedence is normal.  Threshold is normal.     There has not been interval changes. Estimated battery life is NEW     The underlying rhythm is AS, . Atrial Arrhythmia : No    Non sustained VT episodes : No    Sustained VT episodes : No      The patient is pacemaker dependent.           Interpreted by    Radha England M.D

## 2022-09-20 NOTE — DISCHARGE INSTRUCTIONS
1. Avoid raising the arm above shoulder for 4 weeks  2. No driving for 10 days  3. No lifting more than 10 lbs with the left hand  4. Do not wet the area of surgery for 10 days  5. Follow up appointment with Doctor for wound check in 10 days  6. Notify Doctor if pain, fever, chills, swelling or bleeding in the surgical area  7. Please avoid sleeping on the surgical side. 8. Please do not allow anyone other than Dr Tosin Warner staff to remove or redress the surgical site. If the dressing were to fall off or fall partially off before the 10 day follow up appointment, please call the office ASAP.

## 2022-09-20 NOTE — PROGRESS NOTES
Outpatient Pharmacy Progress Note for Meds-to-Beds    Total number of Prescriptions Filled: 1  The following medications were dispensed to the patient during the discharge process:  traMADol    Additional Documentation:  Medication(s) were delivered to the patient's room prior to discharge      Thank you for letting us serve your patients.   [address] [facility]\""    [address], [address] [facility]    Phone: [phone]    Fax: [phone]

## 2022-09-29 ENCOUNTER — NURSE ONLY (OUTPATIENT)
Dept: CARDIOLOGY CLINIC | Age: 82
End: 2022-09-29

## 2022-09-29 VITALS — TEMPERATURE: 97.2 F

## 2022-09-29 DIAGNOSIS — Z95.0 STATUS POST PLACEMENT OF CARDIAC PACEMAKER: Primary | ICD-10-CM

## 2022-09-29 PROCEDURE — 99024 POSTOP FOLLOW-UP VISIT: CPT | Performed by: INTERNAL MEDICINE

## 2022-09-29 NOTE — PROGRESS NOTES
Patient seen for site check post pacer implant. Dressing removed. No signs of inflammation or infection noted. Edges well approximated. Patient has no complaints of pain or discomfort. Two steri strip applied. Patient instructed to not lift arm higher than shoulder level.

## 2022-10-17 ENCOUNTER — OFFICE VISIT (OUTPATIENT)
Dept: CARDIOLOGY CLINIC | Age: 82
End: 2022-10-17
Payer: MEDICARE

## 2022-10-17 ENCOUNTER — TELEPHONE (OUTPATIENT)
Dept: CARDIOLOGY CLINIC | Age: 82
End: 2022-10-17

## 2022-10-17 VITALS
SYSTOLIC BLOOD PRESSURE: 122 MMHG | HEART RATE: 72 BPM | DIASTOLIC BLOOD PRESSURE: 70 MMHG | WEIGHT: 150.8 LBS | HEIGHT: 60 IN | BODY MASS INDEX: 29.61 KG/M2

## 2022-10-17 DIAGNOSIS — R42 DIZZINESS: Primary | ICD-10-CM

## 2022-10-17 DIAGNOSIS — I35.0 AORTIC VALVE STENOSIS, ETIOLOGY OF CARDIAC VALVE DISEASE UNSPECIFIED: ICD-10-CM

## 2022-10-17 DIAGNOSIS — Z95.0 S/P CARDIAC PACEMAKER PROCEDURE: ICD-10-CM

## 2022-10-17 PROCEDURE — 93000 ELECTROCARDIOGRAM COMPLETE: CPT | Performed by: NURSE PRACTITIONER

## 2022-10-17 PROCEDURE — 99024 POSTOP FOLLOW-UP VISIT: CPT | Performed by: NURSE PRACTITIONER

## 2022-10-17 RX ORDER — LISINOPRIL 5 MG/1
5 TABLET ORAL DAILY
COMMUNITY
Start: 2022-09-29

## 2022-10-17 NOTE — PROGRESS NOTES
Patient here today for 1 month follow-up status post implantation of dual-chamber pacemaker. Patient reports she is feeling well. She denies chest pain, palpitations, shortness of breath, lightheadedness, dizziness, edema or syncope  Left upper chest site has healed well. No redness no swelling no hematoma  Device Assessment:  The device is Medtronic pacemaker - Dual Chamber chamber      MRI Compatible : yes    Device interrogation was performed. Mode: DDDR     Sensing is normal. Impedence is normal.  Threshold is normal.     There has not been interval changes. Estimated battery life is 10.6 years     The underlying rhythm is AP,.  43.1 % atrial paced; 99.9 % ventricular paced. Atrial Arrhythmia : 0.1%    Non sustained VT episodes : No    Sustained VT episodes : No    Patient activity reported 1.4 hrs/day    The patient is pacemaker dependent. Patient noted to have atrial fibrillation on device. Discussed with patient about starting anticoagulation for the prevention of stroke in the setting of atrial fibrillation  Patient does not want to start anticoagulation at this time  She would like to wait to see if she is having any more episodes  Discussed with her about risk of stroke. She voiced understanding.   We will continue aspirin 81 mg daily  Patient to follow-up with me in 2 months to reassess

## 2022-10-27 ENCOUNTER — TELEPHONE (OUTPATIENT)
Dept: CARDIOLOGY CLINIC | Age: 82
End: 2022-10-27

## 2022-10-27 NOTE — LETTER
Cardiology [address]  [address]  Phone: [phone]  Fax: [phone]    10/27/2022        [facility]            Dear Gemini Moreno:    This is your [facility] schedule. You can lesia your calendar with these dates. Remember that your device is wireless and should automatically do these checks while you are sleeping. If for any reason I do not get your transmission then I will call you and ask that you send a manual transmission. If you have any questions or concerns, please call and ask for JANE SOUZA at [phone]. Thank you.

## 2022-12-09 ENCOUNTER — OFFICE VISIT (OUTPATIENT)
Dept: CARDIOLOGY CLINIC | Age: 82
End: 2022-12-09

## 2022-12-09 VITALS
WEIGHT: 152 LBS | BODY MASS INDEX: 29.84 KG/M2 | HEIGHT: 60 IN | SYSTOLIC BLOOD PRESSURE: 138 MMHG | OXYGEN SATURATION: 99 % | HEART RATE: 75 BPM | DIASTOLIC BLOOD PRESSURE: 72 MMHG

## 2022-12-09 DIAGNOSIS — Z95.0 S/P CARDIAC PACEMAKER PROCEDURE: ICD-10-CM

## 2022-12-09 DIAGNOSIS — I10 PRIMARY HYPERTENSION: ICD-10-CM

## 2022-12-09 DIAGNOSIS — I44.2 COMPLETE HEART BLOCK (HCC): ICD-10-CM

## 2022-12-09 DIAGNOSIS — I48.0 PAF (PAROXYSMAL ATRIAL FIBRILLATION) (HCC): Primary | ICD-10-CM

## 2022-12-09 NOTE — PROGRESS NOTES
Electrophysiology Follow up note        Reason for consultation: Complete heart block     Chief complaint: follow up on PAF     Referring physician: [facility]        Primary care physician: SHASHI Carias CNP        History of Present Illness: This visit 12/9/2022  Patient here today for follow-up on A. fib noted on pacemaker. Patient reports she has been feeling well. She states that she does have some fatigue. She denies chest pain, palpitations, shortness of breath, lightheadedness, dizziness, edema or syncope. Patient states that she thinks her fatigue is from her arthritis. She does walk with a cane. Previous visit   Racheal Oconnor is an 78year old female with a history of TAVR in 2020, hypertension, hyperlipidemia, vertigo. Patient presents with complaints of bradycardia, fatigue, weakness, and syncope. Patient reports that she has had 3 syncopal episodes over the last few months. She states that she will began to feel dizzy, lightheaded, and will feel like she is going to pass. She states that if she sits down she will not pass out. She was hospitalized 8/24/2022 after having a syncopal episode. She did have work up however could not find the cause of syncope. Patient has had multiple EKG's and have not found the cause of syncopal episodes. She reports that she has been having worsening fatigue also. She was seen today in the office and noted to be in a complete heart block. Patient had TAVR 2 years ago at [facility]. She is on metoprolol 12.5 mg BID. She did take ASA 81 mg this morning. Labs this admission K 4.3, creatinine 0.7, Na 140,   ECHO June 7 2022 EF 55-60%        Pastmedical history:   Past Medical History        Past Medical History:   Diagnosis Date    Chest discomfort      Fatigue      H/O 24 hour EKG monitoring 01/06/2009 1/6/2009 - Baseline rhythm mechanism is normal sinus. Breif episodes of SVT & sinus tachycardia noted.  No clinically significant arrhythmias seen. No untoward symptoms charted in patient's diary. H/O cardiovascular stress test 01/06/2009 1/6/2009 - Normal pattern of perfusion in all regions. LV is normal in size. No wall motion abnormality seen. LVSF is normal. Exercise capcity 8 METS. Exercise capacity is normal. No ECG changes. H/O cardiovascular stress test 12/21/2020     EF 75% abnormal stress test    H/O Doppler ultrasound 07/10/2018     carotid - bilateral mild disease    H/O echocardiogram 4/24/2012, 1/5/2010 4/24/2012 - LVSF is normal. EF = >55%. Impaired LV relaxation. Mild TR, pulmonary HTN, aortic valve calcification & valvular aortic stenosis. .    H/O echocardiogram 08/14/2017     EF55-60% mod-severe AS, mild AR, mild-mod MR    H/O echocardiogram 08/01/2019     EF 55-60%, normally functioning bioprosthetic valve in aortic position, Mild-Mod TR, Mild Pulm HTN    H/O echocardiogram 12/21/2020     EF 55-60% normally functioning bioprostethic valve mild to moderate tricuspid regurgitation mild pulmonary htn no evidence of pericardial effusion. History of blood transfusion      History of exercise stress test 03/10/2020     Treadmill, Physiological BP response to exercise. ETT non diagnostic due to baseline LBBB. Hx of cardiovascular stress test 08/30/2017     EF 60% normal study    Hx of Doppler echocardiogram 07/06/2018     EF 55-60%  mod AR, mild MR, mild to mod TR, and mild pulmonic regurg. Mild pulmonary htn.     Hyperlipemia      Hypertension      Malaise and fatigue      Osteoarthritis      Restless leg      S/P TAVR (transcatheter aortic valve replacement) 09/24/2018     [place]    Sleep disorder      Spinal stenosis      VHD (valvular heart disease)              Surgical history :   Past Surgical History         Past Surgical History:   Procedure Laterality Date    ANOMALOUS VENOUS RETURN REPAIR   09/24/2018     [place]    HYSTERECTOMY (CERVIX STATUS UNKNOWN)        OTHER SURGICAL HISTORY Left       Ear surgery            Family history:   Family History         Family History   Problem Relation Age of Onset    Other Father           appendix ruptured    Heart Failure Mother           CHF    Hypertension Mother      Cancer Sister      Atrial Fibrillation Sister      Atrial Fibrillation Sister      Atrial Fibrillation Sister                    Social history :  reports that she has never smoked. She has never used smokeless tobacco. She reports that she does not drink alcohol and does not use drugs. Allergies   Allergen Reactions    Tape [Adhesive Tape]           No current facility-administered medications on file prior to encounter. Current Outpatient Medications on File Prior to Encounter   Medication Sig Dispense Refill    ampicillin (PRINCIPEN) 500 MG capsule Take 500 mg by mouth 4 times daily        mirabegron (MYRBETRIQ) 25 MG TB24 Take 25 mg by mouth        meloxicam (MOBIC) 15 MG tablet Take 15 mg by mouth        simvastatin (ZOCOR) 40 MG tablet Take 1 tablet by mouth nightly 90 tablet 3    acetaminophen (TYLENOL) 325 MG tablet Take 650 mg by mouth every 6 hours as needed for Pain        vitamin C (ASCORBIC ACID) 500 MG tablet Take 500 mg by mouth daily        vitamin D 25 MCG (1000 UT) CAPS Take by mouth        aspirin 81 MG tablet Take 81 mg by mouth daily        melatonin 3 MG TABS tablet Take 3 mg by mouth daily        oxybutynin (DITROPAN) 5 MG tablet Take 5 mg by mouth 2 times daily         Multiple Vitamins-Minerals (THERAPEUTIC MULTIVITAMIN-MINERALS) tablet Take 1 tablet by mouth daily        CALCIUM PO Take  by mouth daily. GLUCOSAMINE-CHONDROITIN PO Take  by mouth daily. Cyanocobalamin (VITAMIN B-12 PO) Take 2,500 mg by mouth daily         FLUoxetine (PROZAC) 10 MG tablet Take 10 mg by mouth daily. Review of Systems:   Review of Systems   Constitutional:  Positive for activity change and fatigue. Negative for chills and fever.    HENT:  Negative for congestion, ear pain and tinnitus. Eyes:  Negative for photophobia, pain and visual disturbance. Respiratory:  Negative for cough, chest tightness, shortness of breath and wheezing. Cardiovascular:  Negative for chest pain, palpitations and leg swelling. Gastrointestinal:  Negative for abdominal pain, blood in stool, constipation, diarrhea, nausea and vomiting. Endocrine: Negative for cold intolerance and heat intolerance. Genitourinary:  Negative for dysuria, flank pain and hematuria. Musculoskeletal:  Positive for arthralgias. Negative for back pain, myalgias and neck stiffness. Skin:  Negative for color change and rash. Allergic/Immunologic: Negative for food allergies. Neurological:  Negative for dizziness, syncope and light-headedness. Negative for numbness and headaches. Hematological:  Does not bruise/bleed easily. Psychiatric/Behavioral:  Negative for agitation, behavioral problems and confusion. Examination:       Vitals:    12/09/22 1209   BP: 138/72   Pulse: 75   SpO2: 99%          Body mass index is 29.29 kg/m². Physical Exam  Constitutional:       General: She is not in acute distress. Appearance: She is not ill-appearing or diaphoretic. HENT:      Head: Normocephalic and atraumatic. Right Ear: External ear normal.      Left Ear: External ear normal.      Nose: No congestion. Mouth/Throat:      Mouth: Mucous membranes are moist.   Eyes:      Extraocular Movements: Extraocular movements intact. Conjunctiva/sclera: Conjunctivae normal.      Pupils: Pupils are equal, round, and reactive to light. Cardiovascular:      Rate and Rhythm: Regular rhythm. regular rate     Heart sounds: Murmur (grade 2/6 systolic murmur) heard. Pulmonary:      Effort: Pulmonary effort is normal. No respiratory distress. Breath sounds: Normal breath sounds. No wheezing or rhonchi. Abdominal:      General: Abdomen is flat.  Bowel sounds are normal. There is no distension. Palpations: Abdomen is soft. Tenderness: There is no abdominal tenderness. Musculoskeletal:         General: No tenderness. Cervical back: Normal range of motion. No tenderness. Right lower leg: No edema. Left lower leg: No edema. Skin:     General: Skin is warm. Neurological:      General: No focal deficit present. Mental Status: She is alert and oriented to person, place, and time. Cranial Nerves: No cranial nerve deficit. Psychiatric:         Mood and Affect: Mood normal.                  CBC:         Lab Results   Component Value Date/Time     WBC 6.11 12/12/2018 12:00 AM     HGB 12.6 12/12/2018 12:00 AM     HCT 38.3 12/12/2018 12:00 AM      12/12/2018 12:00 AM      Lipids:        Lab Results   Component Value Date     CHOL 156 01/19/2021     TRIG 109 01/19/2021     HDL 53 01/19/2021     LDLCALC 83 01/19/2021     LDLDIRECT 114 (H) 08/20/2018      PT/INR: No results found for: INR     BMP:          Lab Results   Component Value Date      12/12/2018     K 3.8 12/12/2018      12/12/2018     CO2 25 12/12/2018     BUN 16 12/12/2018      CMP:         Lab Results   Component Value Date     AST 22 11/07/2018     PROT 6.1 (L) 08/20/2018     BILITOT 0.5 11/07/2018     ALKPHOS 65 11/07/2018      TSH:  No results found for: TSH, T4     EKGINTERPRETATION - EKG Interpretation:  ventricular paced       Device Assessment:  The device is Medtronic pacemaker - Dual Chamber chamber      MRI Compatible : yes    Device interrogation was performed. Mode:  DDDR     Sensing is normal. Impedence is normal.  Threshold is normal.     There has not been interval changes. Estimated battery life is 12.0 years     The underlying rhythm is AP,.  47.6 % atrial paced; 99.9 % ventricular paced.       Atrial Arrhythmia : No    Non sustained VT episodes : No    Sustained VT episodes : No    Patient activity reported 0.9 hrs/day    The patient is pacemaker dependent. IMPRESSION / RECOMMENDATIONS:      PAF- in sinus rhythm  S/p complete heart block  Syncope  AS s/p TAVR  HTN    Patient had 1 episode of PAF at the 4-week follow-up status post implantation of pacemaker. At that time patient was not interested in going on anticoagulation as she only had 1 isolated episode and she does not want to be on blood thinner  I did interrogate device today and patient has not had any atrial fibrillation  We will continue aspirin 81 mg daily  Patient is only taking metoprolol 12.5 mg daily  Discussed with patient that she needs to increase the dose to metoprolol 12.5 mg twice daily  Voiced understanding    Blood pressure stable. We will continue lisinopril 5 mg daily    Patient to follow-up with cardiology as scheduled  We will continue to monitor transmissions as they are received     Please note this report has been partially produced using speech recognition software and may contain errors related to that system including errors in grammar, punctuation, and spelling, as well as words and phrases that may be inappropriate. If there are any questions or concerns please feel free to contact the dictating provider for clarification.

## 2022-12-27 ENCOUNTER — PROCEDURE VISIT (OUTPATIENT)
Dept: CARDIOLOGY CLINIC | Age: 82
End: 2022-12-27

## 2022-12-27 DIAGNOSIS — Z95.0 CARDIAC PACEMAKER IN SITU: Primary | ICD-10-CM

## 2023-03-15 ENCOUNTER — OFFICE VISIT (OUTPATIENT)
Dept: CARDIOLOGY CLINIC | Age: 83
End: 2023-03-15
Payer: MEDICARE

## 2023-03-15 VITALS
DIASTOLIC BLOOD PRESSURE: 80 MMHG | BODY MASS INDEX: 29.64 KG/M2 | HEART RATE: 60 BPM | SYSTOLIC BLOOD PRESSURE: 130 MMHG | WEIGHT: 151 LBS | HEIGHT: 60 IN

## 2023-03-15 DIAGNOSIS — E78.5 HYPERLIPIDEMIA, UNSPECIFIED HYPERLIPIDEMIA TYPE: ICD-10-CM

## 2023-03-15 DIAGNOSIS — I38 VHD (VALVULAR HEART DISEASE): Primary | ICD-10-CM

## 2023-03-15 PROCEDURE — 99214 OFFICE O/P EST MOD 30 MIN: CPT | Performed by: NURSE PRACTITIONER

## 2023-03-15 PROCEDURE — G8400 PT W/DXA NO RESULTS DOC: HCPCS | Performed by: NURSE PRACTITIONER

## 2023-03-15 PROCEDURE — 1036F TOBACCO NON-USER: CPT | Performed by: NURSE PRACTITIONER

## 2023-03-15 PROCEDURE — G8417 CALC BMI ABV UP PARAM F/U: HCPCS | Performed by: NURSE PRACTITIONER

## 2023-03-15 PROCEDURE — 3075F SYST BP GE 130 - 139MM HG: CPT | Performed by: NURSE PRACTITIONER

## 2023-03-15 PROCEDURE — G8427 DOCREV CUR MEDS BY ELIG CLIN: HCPCS | Performed by: NURSE PRACTITIONER

## 2023-03-15 PROCEDURE — 3079F DIAST BP 80-89 MM HG: CPT | Performed by: NURSE PRACTITIONER

## 2023-03-15 PROCEDURE — 1123F ACP DISCUSS/DSCN MKR DOCD: CPT | Performed by: NURSE PRACTITIONER

## 2023-03-15 PROCEDURE — 1090F PRES/ABSN URINE INCON ASSESS: CPT | Performed by: NURSE PRACTITIONER

## 2023-03-15 PROCEDURE — G8484 FLU IMMUNIZE NO ADMIN: HCPCS | Performed by: NURSE PRACTITIONER

## 2023-03-15 ASSESSMENT — ENCOUNTER SYMPTOMS
SHORTNESS OF BREATH: 0
ORTHOPNEA: 0

## 2023-03-15 NOTE — PROGRESS NOTES
3/15/2023  Primary cardiologist: Dr. Allison Joy:   Delano Hutson  is an established 78 y.o.  female here for a 6 month follow up on valvular heart disease      SUBJECTIVE/OBJECTIVE:  Delano Hutson is a 78 y.o. female with a history of VHD s/p TAVR, hypertension, hyperlipidemia and complete heart block s/p dual-chamber pacemaker  In September 2018 Delano Hutson underwent a TAVR (done at [facility]). In September 2022 she presented with complete heart block and underwent placement of a dual-chamber pacemaker    HPI :   Delano Hutson reports she is feeling tired. Delano Hutson states she recently moved and she feels tired from all the movement. She also endorses episodes of shortness of breath with overexertion. States she has a difficult time with lying flat however she is able to lie on her side without shortness of breath. Review of Systems   Constitutional: Positive for malaise/fatigue. Negative for diaphoresis. Cardiovascular:  Positive for dyspnea on exertion. Negative for chest pain, claudication, irregular heartbeat, leg swelling, near-syncope, orthopnea, palpitations and paroxysmal nocturnal dyspnea. Respiratory:  Negative for shortness of breath. Musculoskeletal:  Positive for arthritis and joint pain (knee). Neurological:  Negative for dizziness and light-headedness. Vitals:    03/15/23 1452   BP: 130/80   Pulse: 60   Weight: 151 lb (68.5 kg)   Height: 5' (1.524 m)     Patient-Reported Vitals 1/29/2021   Patient-Reported Weight 149lb   Patient-Reported Height 5ft   Patient-Reported Systolic 651   Patient-Reported Diastolic 71   Patient-Reported Pulse 75     Wt Readings from Last 3 Encounters:   03/15/23 151 lb (68.5 kg)   12/09/22 152 lb (68.9 kg)   10/17/22 150 lb 12.8 oz (68.4 kg)     Body mass index is 29.49 kg/m². Physical Exam  Vitals reviewed. Constitutional:       Appearance: Normal appearance. HENT:      Head: Normocephalic and atraumatic.    Eyes:      Extraocular Movements: Extraocular movements intact. Pupils: Pupils are equal, round, and reactive to light. Neck:      Vascular: No carotid bruit. Cardiovascular:      Rate and Rhythm: Normal rate and regular rhythm. Pulses: Normal pulses. Pulmonary:      Effort: Pulmonary effort is normal.      Breath sounds: Normal breath sounds. No rales. Chest:      Chest wall: No tenderness. Comments: Left subclavicular pacer pocket intact  Abdominal:      General: There is no distension. Palpations: Abdomen is soft. Tenderness: There is no abdominal tenderness. Musculoskeletal:      Cervical back: No tenderness. Right lower leg: No edema. Left lower leg: No edema. Skin:     General: Skin is warm and dry. Capillary Refill: Capillary refill takes less than 2 seconds. Neurological:      General: No focal deficit present. Mental Status: She is alert and oriented to person, place, and time.    Psychiatric:         Mood and Affect: Mood normal.         Behavior: Behavior normal.              Current Outpatient Medications   Medication Sig Dispense Refill    lisinopril (PRINIVIL;ZESTRIL) 5 MG tablet Take 5 mg by mouth daily      metoprolol tartrate (LOPRESSOR) 25 MG tablet Take 0.5 tablets by mouth 2 times daily 90 tablet 3    simvastatin (ZOCOR) 40 MG tablet Take 1 tablet by mouth nightly 90 tablet 3    acetaminophen (TYLENOL) 325 MG tablet Take 650 mg by mouth every 6 hours as needed for Pain      vitamin C (ASCORBIC ACID) 500 MG tablet Take 500 mg by mouth daily (Patient not taking: No sig reported)      vitamin D 25 MCG (1000 UT) CAPS Take by mouth (Patient not taking: No sig reported)      aspirin 81 MG tablet Take 81 mg by mouth daily      melatonin 3 MG TABS tablet Take 3 mg by mouth daily      oxybutynin (DITROPAN) 5 MG tablet Take 5 mg by mouth 2 times daily  (Patient not taking: Reported on 12/9/2022)      Multiple Vitamins-Minerals (THERAPEUTIC MULTIVITAMIN-MINERALS) tablet Take 1 tablet by mouth daily CALCIUM PO Take  by mouth daily. (Patient not taking: Reported on 10/17/2022)      GLUCOSAMINE-CHONDROITIN PO Take  by mouth daily. (Patient not taking: No sig reported)      FLUoxetine (PROZAC) 10 MG tablet Take 10 mg by mouth daily. No current facility-administered medications for this visit. All pertinent data reviewed and discussed with patient       ASSESSMENT/PLAN:    Valvular heart disease  Marleny underwent TAVR 2018-she now notes shortness of breath and fatigue. Recommend check echocardiogram to assess for wall function along with reevaluation of her valve as valvular failure could be cause for shortness of breath. Clinically she does not appear to be in volume overload. Chest is clear with no peripheral edema, JVD or abdominal bloating. Shortness of breath  Check echocardiogram to assess for wall motion abnormalities and EF as she endorses shortness of breath. Also noted to be 99.9% pacing on pacemaker analysis which can cause pacemaker induced cardiomyopathy. We will check echocardiogram to reassess EF      Hyperlipidemia  Lipids reviewed with patient and are near goal.  We will continue with her simvastatin. She does note pain in her bilateral legs. She can hold her her statin therapy for 2 weeks to see if discomfort goes away and if not she is to resume her atorvastatin. Dual-chamber pacemaker  Maker placed secondary to complete heart block. Analysis reviewed from December 2022 shows normal functioning. Dual-chamber Medtronic pacemaker. Approximate battery life is 11.8 years. No arrhythmias noted. She is pacing 99% of the time. Tests ordered: Echocardiogram : We will call results however if abnormal will bring back to office to rediscuss  Follow-up 6 months or sooner if needed    Signed:  Renna Meigs, APRN - CNP, 3/15/2023, 3:00 PM    An electronic signature was used to authenticate this note.     Please note this report has been partially produced using speech recognition software and may contain errors related to that system including errors in grammar, punctuation, and spelling, as well as words and phrases that may be inappropriate. If there are any questions or concerns please feel free to contact the dictating provider for clarification.

## 2023-04-03 ENCOUNTER — PROCEDURE VISIT (OUTPATIENT)
Dept: CARDIOLOGY CLINIC | Age: 83
End: 2023-04-03
Payer: MEDICARE

## 2023-04-03 DIAGNOSIS — I38 VHD (VALVULAR HEART DISEASE): ICD-10-CM

## 2023-04-03 LAB
LV EF: 58 %
LVEF MODALITY: NORMAL

## 2023-04-03 PROCEDURE — 93306 TTE W/DOPPLER COMPLETE: CPT | Performed by: INTERNAL MEDICINE

## 2023-04-04 ENCOUNTER — TELEPHONE (OUTPATIENT)
Dept: CARDIOLOGY CLINIC | Age: 83
End: 2023-04-04

## 2023-04-07 ENCOUNTER — PROCEDURE VISIT (OUTPATIENT)
Dept: CARDIOLOGY CLINIC | Age: 83
End: 2023-04-07

## 2023-04-07 DIAGNOSIS — Z95.0 S/P CARDIAC PACEMAKER PROCEDURE: Primary | ICD-10-CM

## 2023-04-07 DIAGNOSIS — Z95.0 CARDIAC PACEMAKER IN SITU: ICD-10-CM

## 2023-07-12 ENCOUNTER — PROCEDURE VISIT (OUTPATIENT)
Dept: CARDIOLOGY CLINIC | Age: 83
End: 2023-07-12
Payer: MEDICARE

## 2023-07-12 DIAGNOSIS — Z95.0 CARDIAC PACEMAKER IN SITU: Primary | ICD-10-CM

## 2023-07-12 PROCEDURE — 93296 REM INTERROG EVL PM/IDS: CPT | Performed by: INTERNAL MEDICINE

## 2023-07-12 PROCEDURE — 93294 REM INTERROG EVL PM/LDLS PM: CPT | Performed by: INTERNAL MEDICINE

## 2023-07-18 LAB
A/G RATIO: 2.1
ALBUMIN SERPL-MCNC: 4.7 G/DL
ALBUMIN SERPL-MCNC: 4.7 G/DL
ALP BLD-CCNC: 87 U/L
ALP BLD-CCNC: 87 U/L
ALT SERPL-CCNC: 15 U/L
ALT SERPL-CCNC: 15 U/L
ANION GAP SERPL CALCULATED.3IONS-SCNC: NORMAL MMOL/L
AST SERPL-CCNC: 18 U/L
AST SERPL-CCNC: 18 U/L
BASOPHILS ABSOLUTE: 0 /ΜL
BASOPHILS RELATIVE PERCENT: 0.5 %
BILIRUB SERPL-MCNC: 0.6 MG/DL (ref 0.1–1.4)
BILIRUB SERPL-MCNC: 0.6 MG/DL (ref 0.1–1.4)
BILIRUBIN DIRECT: NORMAL
BILIRUBIN, INDIRECT: NORMAL
BUN BLDV-MCNC: 17 MG/DL
BUN BLDV-MCNC: 17 MG/DL
CALCIUM SERPL-MCNC: 10 MG/DL
CALCIUM SERPL-MCNC: 10 MG/DL
CHLORIDE BLD-SCNC: 105 MMOL/L
CHLORIDE BLD-SCNC: 105 MMOL/L
CO2: 105 MMOL/L
CO2: 28 MMOL/L
CREAT SERPL-MCNC: 0.9 MG/DL
CREAT SERPL-MCNC: 0.9 MG/DL
EGFR: 63
EGFR: 63
EOSINOPHILS ABSOLUTE: 0.4 /ΜL
EOSINOPHILS RELATIVE PERCENT: 5.4 %
GLOBULIN: 2.2
GLUCOSE BLD-MCNC: 101 MG/DL
GLUCOSE BLD-MCNC: 101 MG/DL
HCT VFR BLD CALC: 43.1 % (ref 36–46)
HEMOGLOBIN: 15.2 G/DL (ref 12–16)
LYMPHOCYTES ABSOLUTE: 2.7 /ΜL
LYMPHOCYTES RELATIVE PERCENT: 34.9 %
MCH RBC QN AUTO: 31.9 PG
MCHC RBC AUTO-ENTMCNC: 35.3 G/DL
MCV RBC AUTO: 90.5 FL
MONOCYTES ABSOLUTE: 0.5 /ΜL
MONOCYTES RELATIVE PERCENT: 6.1 %
NEUTROPHILS ABSOLUTE: 4.2 /ΜL
NEUTROPHILS RELATIVE PERCENT: 53 %
PLATELET # BLD: 228 K/ΜL
PMV BLD AUTO: 10.9 FL
POTASSIUM SERPL-SCNC: 4.4 MMOL/L
POTASSIUM SERPL-SCNC: 4.4 MMOL/L
PROTEIN TOTAL: 6.9 G/DL
RBC # BLD: 4.76 10^6/ΜL
SODIUM BLD-SCNC: 142 MMOL/L
SODIUM BLD-SCNC: 142 MMOL/L
TOTAL PROTEIN: 6.9
WBC # BLD: 7.9 10^3/ML

## 2023-07-19 ENCOUNTER — TELEPHONE (OUTPATIENT)
Dept: CARDIOLOGY CLINIC | Age: 83
End: 2023-07-19

## 2023-07-19 NOTE — TELEPHONE ENCOUNTER
Per Dr. Florentino Gregg, pt needs appt to discuss anticoagulation for afib on pacer report. Message to EP to schedule.

## 2023-07-20 ENCOUNTER — TELEPHONE (OUTPATIENT)
Dept: CARDIOLOGY CLINIC | Age: 83
End: 2023-07-20

## 2023-07-20 NOTE — TELEPHONE ENCOUNTER
Patient scheduled with primary cardiologist to discuss anticoagulation per Dr. Robert Avila on pacer report.

## 2023-07-21 ENCOUNTER — TELEPHONE (OUTPATIENT)
Dept: CARDIOLOGY CLINIC | Age: 83
End: 2023-07-21

## 2023-07-21 NOTE — TELEPHONE ENCOUNTER
Unable to know what will be discussed on mondays visit.  Advised daughter we can send her office notes after visit or have her call during visit so she can discuss with

## 2023-07-21 NOTE — TELEPHONE ENCOUNTER
Patient daughter Patsy Alcantara. Will not be able to come in on Monday. She would like to know what is the possible plan for her mother because of the a-fib the found.      Call back at [phone]

## 2023-07-24 ENCOUNTER — OFFICE VISIT (OUTPATIENT)
Dept: CARDIOLOGY CLINIC | Age: 83
End: 2023-07-24
Payer: MEDICARE

## 2023-07-24 VITALS
HEART RATE: 60 BPM | WEIGHT: 148 LBS | BODY MASS INDEX: 29.06 KG/M2 | HEIGHT: 60 IN | DIASTOLIC BLOOD PRESSURE: 66 MMHG | SYSTOLIC BLOOD PRESSURE: 110 MMHG

## 2023-07-24 DIAGNOSIS — I48.0 PAF (PAROXYSMAL ATRIAL FIBRILLATION) (HCC): ICD-10-CM

## 2023-07-24 DIAGNOSIS — Z01.810 PRE-OPERATIVE CARDIOVASCULAR EXAMINATION: Primary | ICD-10-CM

## 2023-07-24 DIAGNOSIS — I48.0 PAF (PAROXYSMAL ATRIAL FIBRILLATION) (HCC): Primary | ICD-10-CM

## 2023-07-24 PROCEDURE — 3074F SYST BP LT 130 MM HG: CPT | Performed by: INTERNAL MEDICINE

## 2023-07-24 PROCEDURE — G8427 DOCREV CUR MEDS BY ELIG CLIN: HCPCS | Performed by: INTERNAL MEDICINE

## 2023-07-24 PROCEDURE — 3078F DIAST BP <80 MM HG: CPT | Performed by: INTERNAL MEDICINE

## 2023-07-24 PROCEDURE — 1036F TOBACCO NON-USER: CPT | Performed by: INTERNAL MEDICINE

## 2023-07-24 PROCEDURE — G8400 PT W/DXA NO RESULTS DOC: HCPCS | Performed by: INTERNAL MEDICINE

## 2023-07-24 PROCEDURE — 1123F ACP DISCUSS/DSCN MKR DOCD: CPT | Performed by: INTERNAL MEDICINE

## 2023-07-24 PROCEDURE — 99214 OFFICE O/P EST MOD 30 MIN: CPT | Performed by: INTERNAL MEDICINE

## 2023-07-24 PROCEDURE — G8417 CALC BMI ABV UP PARAM F/U: HCPCS | Performed by: INTERNAL MEDICINE

## 2023-07-24 PROCEDURE — 1090F PRES/ABSN URINE INCON ASSESS: CPT | Performed by: INTERNAL MEDICINE

## 2023-07-24 RX ORDER — LISINOPRIL 10 MG/1
10 TABLET ORAL DAILY
COMMUNITY

## 2023-07-24 NOTE — PROGRESS NOTES
CARDIOLOGY NOTE      7/24/2023    RE: Joselin Likes  (1940)                               TO:  Dr. Alex Sabillon, APRN - CNP            Danielle Hines is a 78 y.o. female who was seen today for management of valvular heart disease                                    HPI:                   Pt has h/o valvular heart disease, status post TAVR, hypertension, hyperlipidemia, seen today for follow-up.  Pt has no cardiac complaints  In afib on PPM check      Joselin Arora has the following history recorded in care path:  Patient Active Problem List    Diagnosis Date Noted    VHD (valvular heart disease)     Hyperlipemia     PAF (paroxysmal atrial fibrillation) ([address]) 12/09/2022    Hypertension 12/09/2022    Complete heart block ([address]) 09/19/2022    S/P cardiac pacemaker procedure 09/19/2022    Aortic stenosis 08/28/2017    SOBOE (shortness of breath on exertion) 08/28/2017    Snoring 12/17/2021    Hypersomnolence 12/17/2021    Fatigue     Dizziness 06/26/2018     Current Outpatient Medications   Medication Sig Dispense Refill    lisinopril (PRINIVIL;ZESTRIL) 10 MG tablet Take 1 tablet by mouth daily      mirabegron (MYRBETRIQ) 50 MG TB24 Take 50 mg by mouth daily      metoprolol tartrate (LOPRESSOR) 25 MG tablet Take 0.5 tablets by mouth 2 times daily 90 tablet 3    simvastatin (ZOCOR) 40 MG tablet Take 1 tablet by mouth nightly 90 tablet 3    acetaminophen (TYLENOL) 325 MG tablet Take 2 tablets by mouth every 6 hours as needed for Pain      vitamin C (ASCORBIC ACID) 500 MG tablet Take 1 tablet by mouth daily      aspirin 81 MG tablet Take 1 tablet by mouth daily      Multiple Vitamins-Minerals (THERAPEUTIC MULTIVITAMIN-MINERALS) tablet Take 1 tablet by mouth daily      GLUCOSAMINE-CHONDROITIN PO Take by mouth daily      FLUoxetine (PROZAC) 10 MG tablet Take 1 tablet by mouth daily      lisinopril (PRINIVIL;ZESTRIL) 5 MG tablet Take 5 mg by mouth daily (Patient not taking: Reported on

## 2023-07-25 ENCOUNTER — TELEPHONE (OUTPATIENT)
Dept: CARDIOLOGY CLINIC | Age: 83
End: 2023-07-25

## 2023-07-25 NOTE — TELEPHONE ENCOUNTER
Adin Zapata     Transesophageal Echocardiogram with Cardioversion    Patient Name: Willis Lindsay   : 1940   MRN# [id number]     Date of Procedure: 23 Time: 9 am Arrival Time: 8 am   (Arrival time is scheduled for one (1) hour before procedure is scheduled.)     Hospital: [facility])     X   Pretesting done. X   Please do not have anything by mouth after midnight prior to or 8 hours before         the procedure. X   You may take your medication with a sip of water unless advised otherwise below. x Please continue to take Eliquis (apixaban) as directed.

## 2023-07-27 NOTE — TELEPHONE ENCOUNTER
Patient's daughter, Scarlett Amanda, given instructions over telephone on JOHNNIE/CV for Dx: PAF. Procedure is scheduled for 8/1/23 @ 9 AM, w/arrival @ 8 AM, @ [facility]. No more pre testing ordered.

## 2023-08-01 ENCOUNTER — HOSPITAL ENCOUNTER (OUTPATIENT)
Dept: NON INVASIVE DIAGNOSTICS | Age: 83
Discharge: HOME | End: 2023-08-01
Payer: MEDICARE

## 2023-08-01 VITALS
HEART RATE: 64 BPM | OXYGEN SATURATION: 95 % | SYSTOLIC BLOOD PRESSURE: 129 MMHG | DIASTOLIC BLOOD PRESSURE: 57 MMHG | RESPIRATION RATE: 20 BRPM

## 2023-08-01 LAB
LV EF: 58 %
LVEF MODALITY: NORMAL

## 2023-08-01 PROCEDURE — 92960 CARDIOVERSION ELECTRIC EXT: CPT | Performed by: INTERNAL MEDICINE

## 2023-08-01 PROCEDURE — 99152 MOD SED SAME PHYS/QHP 5/>YRS: CPT | Performed by: INTERNAL MEDICINE

## 2023-08-01 PROCEDURE — 92960 CARDIOVERSION ELECTRIC EXT: CPT

## 2023-08-01 PROCEDURE — 93312 ECHO TRANSESOPHAGEAL: CPT

## 2023-08-01 PROCEDURE — 7100000001 HC PACU RECOVERY - ADDTL 15 MIN

## 2023-08-01 PROCEDURE — 93005 ELECTROCARDIOGRAM TRACING: CPT | Performed by: INTERNAL MEDICINE

## 2023-08-01 PROCEDURE — 7100000000 HC PACU RECOVERY - FIRST 15 MIN

## 2023-08-01 PROCEDURE — 93280 PM DEVICE PROGR EVAL DUAL: CPT | Performed by: INTERNAL MEDICINE

## 2023-08-01 NOTE — PROGRESS NOTES
Procedure     Conscious sedation for JOHNNIE     ASA 2  Mallampati 2     After obtaining form consent patient was brought to the holding area and underwent conscious sedation with Versed, remained hemodynamically stable  Patient is hemodynamic status, neuro status was evaluated before conscious sedation  Patient's blood pressure, heart rate, pulse ox, neuro status was monitored for 30 minutes post procedure and she remained stable  The details of the conscious sedation is in the flowsheet in epic

## 2023-08-01 NOTE — OP NOTE
Operative Note      Patient: Melissa Serra  YOB: 1940  MRN: [id number]                                                                                             [facility] NANI Dao MD  FAC                                                                                         Dipl CBNC, CBCCT, NBE, RPVI                                                                                                    CARDIOVESION      Indication: Atrial fibrillation      After obtaining the informed consent pt was sedated  With  versed    . A      200    J synchronised  shock was given. Pt converted to          Pt remained clinically and hemodynamically stable. INR before procedure was        Cont with present medical therapy.       I:  Successful cardioversion    Plan:  Cont present therapy  F/U in office

## 2023-08-03 LAB
EKG ATRIAL RATE: 65 BPM
EKG ATRIAL RATE: 67 BPM
EKG DIAGNOSIS: NORMAL
EKG DIAGNOSIS: NORMAL
EKG P AXIS: 82 DEGREES
EKG P-R INTERVAL: 172 MS
EKG Q-T INTERVAL: 462 MS
EKG Q-T INTERVAL: 512 MS
EKG QRS DURATION: 150 MS
EKG QRS DURATION: 154 MS
EKG QTC CALCULATION (BAZETT): 484 MS
EKG QTC CALCULATION (BAZETT): 532 MS
EKG R AXIS: -63 DEGREES
EKG R AXIS: -66 DEGREES
EKG T AXIS: 100 DEGREES
EKG T AXIS: 89 DEGREES
EKG VENTRICULAR RATE: 65 BPM
EKG VENTRICULAR RATE: 66 BPM

## 2023-08-03 PROCEDURE — 93010 ELECTROCARDIOGRAM REPORT: CPT | Performed by: INTERNAL MEDICINE

## 2023-08-04 ENCOUNTER — TELEPHONE (OUTPATIENT)
Dept: CARDIOLOGY CLINIC | Age: 83
End: 2023-08-04

## 2023-08-15 ENCOUNTER — OFFICE VISIT (OUTPATIENT)
Dept: CARDIOLOGY CLINIC | Age: 83
End: 2023-08-15
Payer: MEDICARE

## 2023-08-15 VITALS
OXYGEN SATURATION: 96 % | BODY MASS INDEX: 29.29 KG/M2 | HEART RATE: 68 BPM | SYSTOLIC BLOOD PRESSURE: 108 MMHG | HEIGHT: 60 IN | WEIGHT: 149.2 LBS | DIASTOLIC BLOOD PRESSURE: 62 MMHG

## 2023-08-15 DIAGNOSIS — I48.0 PAF (PAROXYSMAL ATRIAL FIBRILLATION) (HCC): Primary | ICD-10-CM

## 2023-08-15 PROCEDURE — 1090F PRES/ABSN URINE INCON ASSESS: CPT | Performed by: INTERNAL MEDICINE

## 2023-08-15 PROCEDURE — 1123F ACP DISCUSS/DSCN MKR DOCD: CPT | Performed by: INTERNAL MEDICINE

## 2023-08-15 PROCEDURE — 1036F TOBACCO NON-USER: CPT | Performed by: INTERNAL MEDICINE

## 2023-08-15 PROCEDURE — 99214 OFFICE O/P EST MOD 30 MIN: CPT | Performed by: INTERNAL MEDICINE

## 2023-08-15 PROCEDURE — G8400 PT W/DXA NO RESULTS DOC: HCPCS | Performed by: INTERNAL MEDICINE

## 2023-08-15 PROCEDURE — G8427 DOCREV CUR MEDS BY ELIG CLIN: HCPCS | Performed by: INTERNAL MEDICINE

## 2023-08-15 PROCEDURE — 3074F SYST BP LT 130 MM HG: CPT | Performed by: INTERNAL MEDICINE

## 2023-08-15 PROCEDURE — 3078F DIAST BP <80 MM HG: CPT | Performed by: INTERNAL MEDICINE

## 2023-08-15 PROCEDURE — G8417 CALC BMI ABV UP PARAM F/U: HCPCS | Performed by: INTERNAL MEDICINE

## 2023-08-18 ENCOUNTER — TELEPHONE (OUTPATIENT)
Dept: CARDIOLOGY CLINIC | Age: 83
End: 2023-08-18

## 2023-08-25 ENCOUNTER — OFFICE VISIT (OUTPATIENT)
Dept: CARDIOLOGY CLINIC | Age: 83
End: 2023-08-25
Payer: MEDICARE

## 2023-08-25 VITALS
BODY MASS INDEX: 28.98 KG/M2 | WEIGHT: 147.6 LBS | DIASTOLIC BLOOD PRESSURE: 62 MMHG | SYSTOLIC BLOOD PRESSURE: 116 MMHG | HEART RATE: 64 BPM | HEIGHT: 60 IN

## 2023-08-25 DIAGNOSIS — I48.0 PAF (PAROXYSMAL ATRIAL FIBRILLATION) (HCC): Primary | ICD-10-CM

## 2023-08-25 PROCEDURE — 99213 OFFICE O/P EST LOW 20 MIN: CPT | Performed by: INTERNAL MEDICINE

## 2023-08-25 PROCEDURE — 1123F ACP DISCUSS/DSCN MKR DOCD: CPT | Performed by: INTERNAL MEDICINE

## 2023-08-25 PROCEDURE — 1036F TOBACCO NON-USER: CPT | Performed by: INTERNAL MEDICINE

## 2023-08-25 PROCEDURE — G8400 PT W/DXA NO RESULTS DOC: HCPCS | Performed by: INTERNAL MEDICINE

## 2023-08-25 PROCEDURE — 3078F DIAST BP <80 MM HG: CPT | Performed by: INTERNAL MEDICINE

## 2023-08-25 PROCEDURE — G8417 CALC BMI ABV UP PARAM F/U: HCPCS | Performed by: INTERNAL MEDICINE

## 2023-08-25 PROCEDURE — G8427 DOCREV CUR MEDS BY ELIG CLIN: HCPCS | Performed by: INTERNAL MEDICINE

## 2023-08-25 PROCEDURE — 3074F SYST BP LT 130 MM HG: CPT | Performed by: INTERNAL MEDICINE

## 2023-08-25 PROCEDURE — 1090F PRES/ABSN URINE INCON ASSESS: CPT | Performed by: INTERNAL MEDICINE

## 2023-08-25 ASSESSMENT — ENCOUNTER SYMPTOMS
WHEEZING: 0
BACK PAIN: 0
COLOR CHANGE: 0
NAUSEA: 0
VOMITING: 0
DIARRHEA: 0
EYE PAIN: 0
PHOTOPHOBIA: 0
CONSTIPATION: 0
BLOOD IN STOOL: 0
SHORTNESS OF BREATH: 0
CHEST TIGHTNESS: 0
ABDOMINAL PAIN: 0
COUGH: 0

## 2023-08-25 NOTE — PROGRESS NOTES
Electrophysiology fu Note      Reason for consultation:  atrial fibrillation    Chief complaint :  fatigue    Referring physician:       Primary care physician: SHASHI Nobles - CNP      History of Present Illness: This visit (8/25/2023)      Patient here for atrial fibrillation follow up  Denies chest pain, shortness of breath, palpitations, syncope or presyncope, edema   Patient reports she had cardioversion but she did not feel any change  She has chronic fatigue which is not new and its not bad she reports        Previous visit:     Jayden Munroe is an 78year old female with a history of TAVR in 2020, hypertension, hyperlipidemia, vertigo. Patient presents with complaints of bradycardia, fatigue, weakness, and syncope. Patient reports that she has had 3 syncopal episodes over the last few months. She states that she will began to feel dizzy, lightheaded, and will feel like she is going to pass. She states that if she sits down she will not pass out. She was hospitalized 8/24/2022 after having a syncopal episode. She did have work up however could not find the cause of syncope. Patient has had multiple EKG's and have not found the cause of syncopal episodes. She reports that she has been having worsening fatigue also. She was seen today in the office and noted to be in a complete heart block. Patient had TAVR 2 years ago at [facility]. She is on metoprolol 12.5 mg BID. She did take ASA 81 mg this morning. Labs this admission K 4.3, creatinine 0.7, Na 140,   ECHO June 7 2022 EF 55-60%      Pastmedical history:   Past Medical History:   Diagnosis Date    Chest discomfort     Complete heart block ([address]) 9/19/2022    Fatigue     H/O 24 hour EKG monitoring 01/06/2009 1/6/2009 - Baseline rhythm mechanism is normal sinus. Breif episodes of SVT & sinus tachycardia noted. No clinically significant arrhythmias seen.  No untoward symptoms charted in

## 2023-08-25 NOTE — PATIENT INSTRUCTIONS
Please be informed that if you contact our office outside of normal business hours the physician on call cannot help with any scheduling or rescheduling issues, procedure instruction questions or any type of medication issue. We advise you for any urgent/emergency that you go to the nearest emergency room! PLEASE CALL OUR OFFICE DURING NORMAL BUSINESS HOURS    Monday - Friday   8 am to 5 pm    [place]: [address]: [phone]    [place]:  [phone]    **It is YOUR responsibilty to bring medication bottles and/or updated medication list to [address] [place] [address]. This will allow us to better serve you and all your healthcare needs**    Thank you for allowing us to care for you today! We want to ensure we can follow your treatment plan and we strive to give you the best outcomes and experience possible. If you ever have a life threatening emergency and call 911 - for an ambulance (EMS)   Our providers can only care for you at:   [facility] or [facility]. Even if you have someone take you or you drive yourself we can only care for you in a [address] [place] [address] facility. Our providers are not setup at the other healthcare locations! no abdominal pain

## 2023-09-07 NOTE — TELEPHONE ENCOUNTER
Patient called to see if she can  Eliquis samples she is waiting for [facility] to send her medication she will be out 9/8

## 2023-10-18 ENCOUNTER — TELEPHONE (OUTPATIENT)
Dept: CARDIOLOGY CLINIC | Age: 83
End: 2023-10-18

## 2023-10-19 ENCOUNTER — PROCEDURE VISIT (OUTPATIENT)
Dept: CARDIOLOGY CLINIC | Age: 83
End: 2023-10-19

## 2023-10-19 ENCOUNTER — TELEPHONE (OUTPATIENT)
Dept: CARDIOLOGY CLINIC | Age: 83
End: 2023-10-19

## 2023-10-19 DIAGNOSIS — Z95.0 CARDIAC PACEMAKER IN SITU: Primary | ICD-10-CM

## 2023-12-04 ENCOUNTER — OFFICE VISIT (OUTPATIENT)
Dept: CARDIOLOGY CLINIC | Age: 83
End: 2023-12-04
Payer: MEDICARE

## 2023-12-04 VITALS
HEART RATE: 70 BPM | BODY MASS INDEX: 28.86 KG/M2 | DIASTOLIC BLOOD PRESSURE: 64 MMHG | HEIGHT: 60 IN | SYSTOLIC BLOOD PRESSURE: 110 MMHG | WEIGHT: 147 LBS | OXYGEN SATURATION: 97 %

## 2023-12-04 DIAGNOSIS — I48.0 PAF (PAROXYSMAL ATRIAL FIBRILLATION) (HCC): Primary | ICD-10-CM

## 2023-12-04 PROCEDURE — 3074F SYST BP LT 130 MM HG: CPT | Performed by: INTERNAL MEDICINE

## 2023-12-04 PROCEDURE — G8417 CALC BMI ABV UP PARAM F/U: HCPCS | Performed by: INTERNAL MEDICINE

## 2023-12-04 PROCEDURE — 3078F DIAST BP <80 MM HG: CPT | Performed by: INTERNAL MEDICINE

## 2023-12-04 PROCEDURE — G8400 PT W/DXA NO RESULTS DOC: HCPCS | Performed by: INTERNAL MEDICINE

## 2023-12-04 PROCEDURE — 1123F ACP DISCUSS/DSCN MKR DOCD: CPT | Performed by: INTERNAL MEDICINE

## 2023-12-04 PROCEDURE — G8484 FLU IMMUNIZE NO ADMIN: HCPCS | Performed by: INTERNAL MEDICINE

## 2023-12-04 PROCEDURE — 99214 OFFICE O/P EST MOD 30 MIN: CPT | Performed by: INTERNAL MEDICINE

## 2023-12-04 PROCEDURE — 1036F TOBACCO NON-USER: CPT | Performed by: INTERNAL MEDICINE

## 2023-12-04 PROCEDURE — G8427 DOCREV CUR MEDS BY ELIG CLIN: HCPCS | Performed by: INTERNAL MEDICINE

## 2023-12-04 PROCEDURE — 1090F PRES/ABSN URINE INCON ASSESS: CPT | Performed by: INTERNAL MEDICINE

## 2023-12-04 RX ORDER — SIMVASTATIN 40 MG
40 TABLET ORAL NIGHTLY
Qty: 90 TABLET | Refills: 3 | Status: SHIPPED | OUTPATIENT
Start: 2023-12-04

## 2023-12-04 RX ORDER — LISINOPRIL 10 MG/1
10 TABLET ORAL DAILY
Qty: 90 TABLET | Refills: 3 | Status: SHIPPED | OUTPATIENT
Start: 2023-12-04

## 2023-12-04 NOTE — PROGRESS NOTES
apixaban (ELIQUIS) 5 MG TABS tablet Take 1 tablet by mouth 2 times daily 28 tablet 0     No current facility-administered medications for this visit. Allergies: Tape [adhesive tape]  Past Medical History:   Diagnosis Date    Chest discomfort     Complete heart block ([address]) 9/19/2022    Fatigue     H/O 24 hour EKG monitoring 01/06/2009 1/6/2009 - Baseline rhythm mechanism is normal sinus. Breif episodes of SVT & sinus tachycardia noted. No clinically significant arrhythmias seen. No untoward symptoms charted in patient's diary. H/O cardiovascular stress test 01/06/2009 1/6/2009 - Normal pattern of perfusion in all regions. LV is normal in size. No wall motion abnormality seen. LVSF is normal. Exercise capcity 8 METS. Exercise capacity is normal. No ECG changes. H/O cardiovascular stress test 12/21/2020    EF 75% abnormal stress test    H/O Doppler ultrasound 07/10/2018    carotid - bilateral mild disease    H/O echocardiogram 4/24/2012, 1/5/2010 4/24/2012 - LVSF is normal. EF = >55%. Impaired LV relaxation. Mild TR, pulmonary HTN, aortic valve calcification & valvular aortic stenosis. .    H/O echocardiogram 08/14/2017    EF55-60% mod-severe AS, mild AR, mild-mod MR    H/O echocardiogram 08/01/2019    EF 55-60%, normally functioning bioprosthetic valve in aortic position, Mild-Mod TR, Mild Pulm HTN    H/O echocardiogram 12/21/2020    EF 55-60% normally functioning bioprostethic valve mild to moderate tricuspid regurgitation mild pulmonary htn no evidence of pericardial effusion. History of blood transfusion     History of exercise stress test 03/10/2020    Treadmill, Physiological BP response to exercise. ETT non diagnostic due to baseline LBBB. Hx of cardiovascular stress test 08/30/2017    EF 60% normal study    Hx of Doppler echocardiogram 07/06/2018    EF 55-60%  mod AR, mild MR, mild to mod TR, and mild pulmonic regurg. Mild pulmonary htn.     Hyperlipemia     Hypertension     Malaise

## 2023-12-04 NOTE — PATIENT INSTRUCTIONS
Please be informed that if you contact our office outside of normal business hours the physician on call cannot help with any scheduling or rescheduling issues, procedure instruction questions or any type of medication issue. We advise you for any urgent/emergency that you go to the nearest emergency room! PLEASE CALL OUR OFFICE DURING NORMAL BUSINESS HOURS    Monday - Friday   8 am to 5 pm    [place]: [address]: [phone]    [place]:  [phone]    **It is YOUR responsibilty to bring medication bottles and/or updated medication list to [facility]. This will allow us to better serve you and all your healthcare needs**    Thank you for allowing us to care for you today! We want to ensure we can follow your treatment plan and we strive to give you the best outcomes and experience possible. If you ever have a life threatening emergency and call 911 - for an ambulance (EMS)   Our providers can only care for you at:   [facility] or [facility]. Even if you have someone take you or you drive yourself we can only care for you in a [facility] facility. Our providers are not setup at the other healthcare locations! We are committed to providing you the best care possible. If you receive a survey after visiting one of our offices, please take time to share your experience concerning your physician office visit. These surveys are confidential and no health information about you is shared. We are eager to improve for you and we are counting on your feedback to help make that happen.

## 2023-12-05 ENCOUNTER — TELEPHONE (OUTPATIENT)
Dept: CARDIOLOGY CLINIC | Age: 83
End: 2023-12-05

## 2023-12-22 PROBLEM — I48.0 HYPERCOAGULABLE STATE DUE TO PAROXYSMAL ATRIAL FIBRILLATION (HCC): Status: ACTIVE | Noted: 2023-12-22

## 2023-12-22 PROBLEM — D68.69 HYPERCOAGULABLE STATE DUE TO PAROXYSMAL ATRIAL FIBRILLATION (HCC): Status: ACTIVE | Noted: 2023-12-22

## 2024-01-22 ENCOUNTER — PROCEDURE VISIT (OUTPATIENT)
Dept: CARDIOLOGY CLINIC | Age: 84
End: 2024-01-22

## 2024-01-22 DIAGNOSIS — Z95.0 CARDIAC PACEMAKER IN SITU: Primary | ICD-10-CM

## 2024-03-20 NOTE — TELEPHONE ENCOUNTER
Samples/Eliquis 5mgs. Patient will  on 3/21.  She is requesting samples until her mail order gets to her. Patient out of this medication.

## 2024-03-25 ENCOUNTER — OFFICE VISIT (OUTPATIENT)
Dept: CARDIOLOGY CLINIC | Age: 84
End: 2024-03-25
Payer: MEDICARE

## 2024-03-25 VITALS
OXYGEN SATURATION: 96 % | BODY MASS INDEX: 27.88 KG/M2 | DIASTOLIC BLOOD PRESSURE: 64 MMHG | SYSTOLIC BLOOD PRESSURE: 110 MMHG | WEIGHT: 142 LBS | HEART RATE: 62 BPM | HEIGHT: 60 IN

## 2024-03-25 DIAGNOSIS — I10 PRIMARY HYPERTENSION: ICD-10-CM

## 2024-03-25 DIAGNOSIS — I48.0 HYPERCOAGULABLE STATE DUE TO PAROXYSMAL ATRIAL FIBRILLATION (HCC): ICD-10-CM

## 2024-03-25 DIAGNOSIS — D68.69 HYPERCOAGULABLE STATE DUE TO PAROXYSMAL ATRIAL FIBRILLATION (HCC): ICD-10-CM

## 2024-03-25 DIAGNOSIS — I48.19 PERSISTENT ATRIAL FIBRILLATION (HCC): Primary | ICD-10-CM

## 2024-03-25 PROCEDURE — G8417 CALC BMI ABV UP PARAM F/U: HCPCS | Performed by: NURSE PRACTITIONER

## 2024-03-25 PROCEDURE — 1090F PRES/ABSN URINE INCON ASSESS: CPT | Performed by: NURSE PRACTITIONER

## 2024-03-25 PROCEDURE — G8484 FLU IMMUNIZE NO ADMIN: HCPCS | Performed by: NURSE PRACTITIONER

## 2024-03-25 PROCEDURE — G8427 DOCREV CUR MEDS BY ELIG CLIN: HCPCS | Performed by: NURSE PRACTITIONER

## 2024-03-25 PROCEDURE — 1123F ACP DISCUSS/DSCN MKR DOCD: CPT | Performed by: NURSE PRACTITIONER

## 2024-03-25 PROCEDURE — G8400 PT W/DXA NO RESULTS DOC: HCPCS | Performed by: NURSE PRACTITIONER

## 2024-03-25 PROCEDURE — 3074F SYST BP LT 130 MM HG: CPT | Performed by: NURSE PRACTITIONER

## 2024-03-25 PROCEDURE — 3078F DIAST BP <80 MM HG: CPT | Performed by: NURSE PRACTITIONER

## 2024-03-25 PROCEDURE — 93288 INTERROG EVL PM/LDLS PM IP: CPT | Performed by: NURSE PRACTITIONER

## 2024-03-25 PROCEDURE — 99214 OFFICE O/P EST MOD 30 MIN: CPT | Performed by: NURSE PRACTITIONER

## 2024-03-25 PROCEDURE — 1036F TOBACCO NON-USER: CPT | Performed by: NURSE PRACTITIONER

## 2024-03-25 ASSESSMENT — ENCOUNTER SYMPTOMS
BACK PAIN: 0
CONSTIPATION: 0
COLOR CHANGE: 0
DIARRHEA: 0
ABDOMINAL DISTENTION: 0
BLOOD IN STOOL: 0
SINUS PAIN: 0
NAUSEA: 0
PHOTOPHOBIA: 0
COUGH: 0
VOMITING: 0
SINUS PRESSURE: 0
ABDOMINAL PAIN: 0
SHORTNESS OF BREATH: 0

## 2024-03-25 NOTE — PATIENT INSTRUCTIONS
Please be informed that if you contact our office outside of normal business hours the physician on call cannot help with any scheduling or rescheduling issues, procedure instruction questions or any type of medication issue.    We advise you for any urgent/emergency that you go to the nearest emergency room!    PLEASE CALL OUR OFFICE DURING NORMAL BUSINESS HOURS    Monday - Friday   8 am to 5 pm    [place]: [phone]    [place]: [phone]    [place]:  [phone]  **It is YOUR responsibilty to bring medication bottles and/or updated medication list to EACH APPOINTMENT. This will allow us to better serve you and all your healthcare needs**  Thank you for allowing us to care for you today!   We want to ensure we can follow your treatment plan and we strive to give you the best outcomes and experience possible.   If you ever have a life threatening emergency and call 911 - for an ambulance (EMS)   Our providers can only care for you at:   [facility] or [facility].   Even if you have someone take you or you drive yourself we can only care for you in a [facility] facility. Our providers are not setup at the other healthcare locations!   We are committed to providing you the best care possible.    If you receive a survey after visiting one of our offices, please take time to share your experience concerning your physician office visit.  These surveys are confidential and no health information about you is shared.    We are eager to improve for you and we are counting on your feedback to help make that happen.

## 2024-03-25 NOTE — PROGRESS NOTES
Assessment:      The device is Medtronic pacemaker - Dual Chamber chamber      MRI Compatible : yes    Device interrogation was performed.    Mode: DDDR     Sensing is normal. Impedence is normal.  Threshold is normal.     There has not been interval changes.     Estimated battery life is 10.7 years     The underlying rhythm is AP, .  10.1% atrial paced; 99.9 % ventricular paced.      Atrial Arrhythmia : 96.3% burden    Non sustained VT episodes : No    Sustained VT episodes : No    Patient activity reported 0.9 hrs/day    The patient is pacemaker dependent.            IMPRESSION / RECOMMENDATIONS:     Persistent atrial fibrillation  Hypercoagulable due to persistent atrial fibrillation  Complete heart block SP PACEMAKER  AS p TAVR  HTN    Pacemaker interrogated  Patient in persistent atrial fibrillation  Ventricular rate is controlled  She is having fatigue and balance issues  She states that she would like to proceed with cardioversion and antiarrhythmic medication  Discussed with her primary cardiologist and he will cardiovert her.  Then we will admit for sotalol or Tikosyn loading  Patient will require a 72-hour hospital admission  Patient agrees to plan    Continue Eliquis 5 mg twice daily.  Patient denies issues with bleeding.  Vitals:    03/25/24 1603   BP: 110/64   Pulse: 62   SpO2: 96%       Blood pressure stable continue lisinopril 10 mg daily        Thanks again for allowing me to participate in care of this patient. Please call me if you have any questions.    With best regards.      Adela Dunn, SHASHI - CNP, 3/25/2024 4:07 PM     Please note this report has been partially produced using speech recognition software and may contain errors related to that system including errors in grammar, punctuation, and spelling, as well as words and phrases that may be inappropriate. If there are any questions or concerns please feel free to contact the dictating provider for clarification.

## 2024-03-26 DIAGNOSIS — Z01.810 PRE-OPERATIVE CARDIOVASCULAR EXAMINATION: Primary | ICD-10-CM

## 2024-03-27 ENCOUNTER — TELEPHONE (OUTPATIENT)
Dept: CARDIOLOGY CLINIC | Age: 84
End: 2024-03-27

## 2024-03-27 NOTE — TELEPHONE ENCOUNTER
[facility]     Dr. Juan Camilo     Transesophageal Echocardiogram with Cardioversion    Patient Name: Marleny Tong   : 1940   MRN# [id number]     Date of Procedure: 24 Time: 9am Arrival Time: 8am   (Arrival time is scheduled for one (1) hour before procedure is scheduled.)     Hospital: [facility])     X   If you have received orders for blood work and or a chest x-ray, please have         them done on assigned date at [facility],         [facility], or [facility].    X   Please do not have anything by mouth after midnight prior to or 8 hours before         the procedure.    X   You may take your medication with a sip of water unless advised otherwise below.     X Please continue to take Eliquis (apixaban) as directed.

## 2024-03-28 ENCOUNTER — TELEPHONE (OUTPATIENT)
Dept: CARDIOLOGY CLINIC | Age: 84
End: 2024-03-28

## 2024-03-28 NOTE — TELEPHONE ENCOUNTER
Called patient s daughter, Lor does not think at this time patient being started on Tikosyn is warranted as they do not think her symptoms her from patient PAF. Attempted to give some advise, but also stated that is was completely up to the patient/family as if patient starts on medication or she doesn't. Daughter stated her weakness is just not from her a-fib. Advised Adela Horowitz gone for the day, Lor would really Adela to call her to discuss. Advised Lor I would leave a note for Adela to call to discuss.

## 2024-04-02 ENCOUNTER — TELEPHONE (OUTPATIENT)
Dept: CARDIOLOGY CLINIC | Age: 84
End: 2024-04-02

## 2024-04-29 ENCOUNTER — PROCEDURE VISIT (OUTPATIENT)
Dept: CARDIOLOGY CLINIC | Age: 84
End: 2024-04-29

## 2024-04-29 DIAGNOSIS — Z95.0 CARDIAC PACEMAKER IN SITU: Primary | ICD-10-CM

## 2024-05-09 LAB
ALBUMIN SERPL-MCNC: 4.3 G/DL
ALP BLD-CCNC: 72 U/L
ALT SERPL-CCNC: 21 U/L
ANION GAP SERPL CALCULATED.3IONS-SCNC: 2 MMOL/L
AST SERPL-CCNC: 23 U/L
BASOPHILS ABSOLUTE: 0 /ΜL
BASOPHILS RELATIVE PERCENT: 0.6 %
BILIRUB SERPL-MCNC: 0.7 MG/DL (ref 0.1–1.4)
BUN BLDV-MCNC: 21 MG/DL
CALCIUM SERPL-MCNC: 9.3 MG/DL
CHLORIDE BLD-SCNC: 106 MMOL/L
CHOLESTEROL, TOTAL: 123 MG/DL
CHOLESTEROL/HDL RATIO: NORMAL
CO2: 21 MMOL/L
CREAT SERPL-MCNC: 0.7 MG/DL
EGFR: 85
EOSINOPHILS ABSOLUTE: 0.3 /ΜL
EOSINOPHILS RELATIVE PERCENT: 3.9 %
ESTIMATED AVERAGE GLUCOSE: NORMAL
GLUCOSE BLD-MCNC: 88 MG/DL
HBA1C MFR BLD: 6.1 %
HCT VFR BLD CALC: 41.2 % (ref 36–46)
HDLC SERPL-MCNC: 49 MG/DL (ref 35–70)
HEMOGLOBIN: 13.7 G/DL (ref 12–16)
LDL CHOLESTEROL CALCULATED: 55 MG/DL (ref 0–160)
LYMPHOCYTES ABSOLUTE: 2.3 /ΜL
LYMPHOCYTES RELATIVE PERCENT: 32.2 %
MCH RBC QN AUTO: 30.4 PG
MCHC RBC AUTO-ENTMCNC: 33.3 G/DL
MCV RBC AUTO: 91.4 FL
MONOCYTES ABSOLUTE: 0.6 /ΜL
MONOCYTES RELATIVE PERCENT: 8 %
NEUTROPHILS ABSOLUTE: 3.9 /ΜL
NEUTROPHILS RELATIVE PERCENT: 54.9 %
NONHDLC SERPL-MCNC: NORMAL MG/DL
PLATELET # BLD: 234 K/ΜL
PMV BLD AUTO: 11.2 FL
POTASSIUM SERPL-SCNC: 4.4 MMOL/L
RBC # BLD: 4.51 10^6/ΜL
SODIUM BLD-SCNC: 142 MMOL/L
TOTAL PROTEIN: 6.5
TRIGL SERPL-MCNC: 94 MG/DL
TSH SERPL DL<=0.05 MIU/L-ACNC: 1.15 UIU/ML
VLDLC SERPL CALC-MCNC: 19 MG/DL
WBC # BLD: 7.1 10^3/ML

## 2024-05-30 ENCOUNTER — TELEPHONE (OUTPATIENT)
Dept: CARDIOLOGY CLINIC | Age: 84
End: 2024-05-30

## 2024-05-30 NOTE — TELEPHONE ENCOUNTER
Called patient and advised her BP regulation she needs to follow up with Dr Ton Chapman. Apt scheduled with Dr Camilo 6/3/2024.    Palliative Care

## 2024-05-30 NOTE — TELEPHONE ENCOUNTER
Patient would like to talk to Adela about her blood pressure issues.  BP: 164/90 HR 70 this is on average.

## 2024-06-03 ENCOUNTER — OFFICE VISIT (OUTPATIENT)
Dept: CARDIOLOGY CLINIC | Age: 84
End: 2024-06-03
Payer: MEDICARE

## 2024-06-03 VITALS
HEART RATE: 71 BPM | SYSTOLIC BLOOD PRESSURE: 150 MMHG | BODY MASS INDEX: 27.48 KG/M2 | HEIGHT: 60 IN | DIASTOLIC BLOOD PRESSURE: 82 MMHG | WEIGHT: 140 LBS

## 2024-06-03 DIAGNOSIS — I38 VHD (VALVULAR HEART DISEASE): Primary | ICD-10-CM

## 2024-06-03 PROCEDURE — 93000 ELECTROCARDIOGRAM COMPLETE: CPT | Performed by: INTERNAL MEDICINE

## 2024-06-03 PROCEDURE — G8417 CALC BMI ABV UP PARAM F/U: HCPCS | Performed by: INTERNAL MEDICINE

## 2024-06-03 PROCEDURE — 1036F TOBACCO NON-USER: CPT | Performed by: INTERNAL MEDICINE

## 2024-06-03 PROCEDURE — 1090F PRES/ABSN URINE INCON ASSESS: CPT | Performed by: INTERNAL MEDICINE

## 2024-06-03 PROCEDURE — 99214 OFFICE O/P EST MOD 30 MIN: CPT | Performed by: INTERNAL MEDICINE

## 2024-06-03 PROCEDURE — 1123F ACP DISCUSS/DSCN MKR DOCD: CPT | Performed by: INTERNAL MEDICINE

## 2024-06-03 PROCEDURE — G8427 DOCREV CUR MEDS BY ELIG CLIN: HCPCS | Performed by: INTERNAL MEDICINE

## 2024-06-03 RX ORDER — LISINOPRIL 10 MG/1
20 TABLET ORAL DAILY
Qty: 90 TABLET | Refills: 3
Start: 2024-06-03

## 2024-06-03 RX ORDER — AMLODIPINE BESYLATE 5 MG/1
5 TABLET ORAL DAILY
Qty: 30 TABLET | Refills: 3 | Status: SHIPPED | OUTPATIENT
Start: 2024-06-03 | End: 2024-06-03

## 2024-06-03 NOTE — PROGRESS NOTES
CARDIOLOGY NOTE      6/3/2024    RE: Marleny Tong  (1940)                               TO:  Shanon Thornton, APRN - CNP            CHIEF COMPLAINT   Marleny is a 84 y.o. female who was seen today for management of valvular heart disease                                    HPI:                   Pt has h/o valvular heart disease, status post TAVR, hypertension, hyperlipidemia, PPMseen today for follow-up. Pt has no cardiac complaints  Has issues with high BP      Marleny Tong has the following history recorded in care path:  Patient Active Problem List    Diagnosis Date Noted    VHD (valvular heart disease)     Hyperlipemia     PAF (paroxysmal atrial fibrillation) (HCC) 12/09/2022    Hypertension 12/09/2022    Complete heart block (HCC) 09/19/2022    Pacemaker 09/19/2022    Aortic stenosis 08/28/2017    SOBOE (shortness of breath on exertion) 08/28/2017    Persistent atrial fibrillation (HCC) 03/25/2024    Hypercoagulable state due to paroxysmal atrial fibrillation (HCC) 12/22/2023    Snoring 12/17/2021    Hypersomnolence 12/17/2021    Fatigue     Dizziness 06/26/2018     Current Outpatient Medications   Medication Sig Dispense Refill    lisinopril (PRINIVIL;ZESTRIL) 10 MG tablet Take 2 tablets by mouth daily 90 tablet 3    metoprolol tartrate (LOPRESSOR) 25 MG tablet TAKE 1 TABLET BY MOUTH TWICE  DAILY 180 tablet 1    Cyanocobalamin (VITAMIN B-12 PO) Take by mouth daily      apixaban (ELIQUIS) 5 MG TABS tablet Take 1 tablet by mouth 2 times daily 180 tablet 1    simvastatin (ZOCOR) 40 MG tablet Take 1 tablet by mouth nightly 90 tablet 3    mirabegron (MYRBETRIQ) 50 MG TB24 Take 50 mg by mouth daily      acetaminophen (TYLENOL) 325 MG tablet Take 2 tablets by mouth every 6 hours as needed for Pain      aspirin 81 MG tablet Take 1 tablet by mouth daily      Multiple Vitamins-Minerals (THERAPEUTIC MULTIVITAMIN-MINERALS) tablet Take 1 tablet by mouth daily      GLUCOSAMINE-CHONDROITIN PO

## 2024-06-12 ENCOUNTER — TELEPHONE (OUTPATIENT)
Dept: CARDIOLOGY CLINIC | Age: 84
End: 2024-06-12

## 2024-06-12 RX ORDER — LISINOPRIL 20 MG/1
20 TABLET ORAL DAILY
Qty: 90 TABLET | Refills: 3 | Status: CANCELLED | OUTPATIENT
Start: 2024-06-12

## 2024-07-29 ENCOUNTER — TELEPHONE (OUTPATIENT)
Dept: CARDIOLOGY CLINIC | Age: 84
End: 2024-07-29

## 2024-07-29 NOTE — TELEPHONE ENCOUNTER
Patient called her left breast turned black   Its now fading to colors of yellow and purple  She is asking if this is from her Eliquis   Doesn't remember bumping her breast   Please advise she is getting a Mammogram    And is concerned .please call this afternoon  Has a dr hall

## 2024-08-03 PROCEDURE — 93296 REM INTERROG EVL PM/IDS: CPT | Performed by: INTERNAL MEDICINE

## 2024-08-03 PROCEDURE — 93294 REM INTERROG EVL PM/LDLS PM: CPT | Performed by: INTERNAL MEDICINE

## 2024-08-05 ENCOUNTER — PROCEDURE VISIT (OUTPATIENT)
Dept: CARDIOLOGY CLINIC | Age: 84
End: 2024-08-05
Payer: MEDICARE

## 2024-08-05 DIAGNOSIS — Z95.0 CARDIAC PACEMAKER IN SITU: Primary | ICD-10-CM

## 2024-08-05 RX ORDER — AMLODIPINE BESYLATE 5 MG/1
5 TABLET ORAL DAILY
Qty: 90 TABLET | Refills: 3 | OUTPATIENT
Start: 2024-08-05

## 2024-08-22 ENCOUNTER — TELEPHONE (OUTPATIENT)
Dept: CARDIOLOGY CLINIC | Age: 84
End: 2024-08-22

## 2024-08-22 NOTE — TELEPHONE ENCOUNTER
Patient called she is waiting for her mail order   Of Eliquis , she is asking for samples   Appt 9/4

## 2024-08-23 ENCOUNTER — TELEPHONE (OUTPATIENT)
Dept: CARDIOLOGY CLINIC | Age: 84
End: 2024-08-23

## 2024-08-23 NOTE — TELEPHONE ENCOUNTER
Pt said her eliquis is now 500.00 some dollars and she can't afford it. Could eliquis be making her breast black

## 2024-09-04 ENCOUNTER — OFFICE VISIT (OUTPATIENT)
Dept: CARDIOLOGY CLINIC | Age: 84
End: 2024-09-04

## 2024-09-04 VITALS
WEIGHT: 137 LBS | BODY MASS INDEX: 26.9 KG/M2 | OXYGEN SATURATION: 94 % | HEART RATE: 64 BPM | SYSTOLIC BLOOD PRESSURE: 126 MMHG | DIASTOLIC BLOOD PRESSURE: 64 MMHG | HEIGHT: 60 IN

## 2024-09-04 DIAGNOSIS — Z95.0 PACEMAKER: ICD-10-CM

## 2024-09-04 DIAGNOSIS — I38 VHD (VALVULAR HEART DISEASE): Primary | ICD-10-CM

## 2024-09-04 DIAGNOSIS — I48.0 PAF (PAROXYSMAL ATRIAL FIBRILLATION) (HCC): ICD-10-CM

## 2024-09-04 RX ORDER — LISINOPRIL 20 MG/1
20 TABLET ORAL DAILY
Qty: 90 TABLET | Refills: 3 | Status: SHIPPED | OUTPATIENT
Start: 2024-09-04

## 2024-09-04 ASSESSMENT — ENCOUNTER SYMPTOMS
SHORTNESS OF BREATH: 0
ORTHOPNEA: 0

## 2024-09-04 NOTE — PROGRESS NOTES
9/4/2024  Primary cardiologist: Dr. Camilo    CC:   Marleny  is an established 84 y.o.  female here for a follow up on valvular heart disease      SUBJECTIVE/OBJECTIVE:  HPI  Marleny is a 84 y.o. female with a history of valvular heart disease, aortic stenosis s/p TAVR, hypertension, hyperlipidemia, complete heart block s/p pacemaker and atrial fibrillation    Marleny reports sometimes she is feels a little off balance and tired.  She is able to complete her ADLs without significant shortness of breath.  She lives at the [facility] home and actively participates in chair exercises.  Since last visit has had no episodes of palpitations.  She is tolerating blood thinner without hematuria, hematochezia and or melena.     Review of Systems   Constitutional: Positive for malaise/fatigue. Negative for diaphoresis.   Cardiovascular:  Negative for chest pain, claudication, dyspnea on exertion, irregular heartbeat, leg swelling, near-syncope, orthopnea, palpitations and paroxysmal nocturnal dyspnea.   Respiratory:  Negative for shortness of breath.    Neurological:  Negative for dizziness and light-headedness.       Vitals:    09/04/24 1329   BP: 126/64   Site: Left Upper Arm   Position: Sitting   Cuff Size: Medium Adult   Pulse: 64   SpO2: 94%   Weight: 62.1 kg (137 lb)   Height: 1.524 m (5')     Wt Readings from Last 3 Encounters:   09/04/24 62.1 kg (137 lb)   06/03/24 63.5 kg (140 lb)   03/25/24 64.4 kg (142 lb)      Body mass index is 26.76 kg/m².     Physical Exam  Vitals reviewed.   Eyes:      Pupils: Pupils are equal, round, and reactive to light.   Neck:      Vascular: No carotid bruit.   Cardiovascular:      Rate and Rhythm: Normal rate and regular rhythm.      Pulses: Normal pulses.      Heart sounds: Murmur heard.      Systolic murmur is present with a grade of 2/6.   Pulmonary:      Effort: Pulmonary effort is normal.      Breath sounds: Normal breath sounds. No rales.   Chest:      Chest wall: No tenderness.

## 2024-09-24 ENCOUNTER — TELEPHONE (OUTPATIENT)
Dept: CARDIOLOGY CLINIC | Age: 84
End: 2024-09-24

## 2024-10-03 RX ORDER — METOPROLOL TARTRATE 25 MG/1
25 TABLET, FILM COATED ORAL 2 TIMES DAILY
Qty: 180 TABLET | Refills: 3 | Status: SHIPPED | OUTPATIENT
Start: 2024-10-03

## 2024-10-14 ENCOUNTER — TELEPHONE (OUTPATIENT)
Dept: CARDIOLOGY CLINIC | Age: 84
End: 2024-10-14

## 2024-10-31 ENCOUNTER — TELEPHONE (OUTPATIENT)
Dept: CARDIOLOGY CLINIC | Age: 84
End: 2024-10-31

## 2024-11-12 PROCEDURE — 93294 REM INTERROG EVL PM/LDLS PM: CPT | Performed by: INTERNAL MEDICINE

## 2024-11-12 PROCEDURE — 93296 REM INTERROG EVL PM/IDS: CPT | Performed by: INTERNAL MEDICINE

## 2024-11-15 RX ORDER — SIMVASTATIN 40 MG
40 TABLET ORAL NIGHTLY
Qty: 90 TABLET | Refills: 3 | Status: SHIPPED | OUTPATIENT
Start: 2024-11-15

## 2024-11-26 NOTE — TELEPHONE ENCOUNTER
Patient called for samples Eliquis   She is out as of today asking if  She can  today after 2:00  Please advise.

## 2025-01-09 NOTE — TELEPHONE ENCOUNTER
Pt is requesting samples of Eliquis 5mg. She also needs a script called into [facility]. 30 day supply.

## 2025-04-16 PROCEDURE — 93294 REM INTERROG EVL PM/LDLS PM: CPT | Performed by: INTERNAL MEDICINE

## 2025-04-16 PROCEDURE — 93296 REM INTERROG EVL PM/IDS: CPT | Performed by: INTERNAL MEDICINE

## 2025-05-01 ENCOUNTER — OFFICE VISIT (OUTPATIENT)
Dept: CARDIOLOGY CLINIC | Age: 85
End: 2025-05-01
Payer: MEDICARE

## 2025-05-01 VITALS
OXYGEN SATURATION: 94 % | SYSTOLIC BLOOD PRESSURE: 130 MMHG | WEIGHT: 138.4 LBS | HEART RATE: 62 BPM | DIASTOLIC BLOOD PRESSURE: 74 MMHG | HEIGHT: 60 IN | BODY MASS INDEX: 27.17 KG/M2

## 2025-05-01 DIAGNOSIS — I38 VHD (VALVULAR HEART DISEASE): ICD-10-CM

## 2025-05-01 DIAGNOSIS — I48.20 CHRONIC ATRIAL FIBRILLATION (HCC): Primary | ICD-10-CM

## 2025-05-01 DIAGNOSIS — I10 PRIMARY HYPERTENSION: ICD-10-CM

## 2025-05-01 PROCEDURE — 99214 OFFICE O/P EST MOD 30 MIN: CPT | Performed by: NURSE PRACTITIONER

## 2025-05-01 PROCEDURE — G8417 CALC BMI ABV UP PARAM F/U: HCPCS | Performed by: NURSE PRACTITIONER

## 2025-05-01 PROCEDURE — 1036F TOBACCO NON-USER: CPT | Performed by: NURSE PRACTITIONER

## 2025-05-01 PROCEDURE — 1160F RVW MEDS BY RX/DR IN RCRD: CPT | Performed by: NURSE PRACTITIONER

## 2025-05-01 PROCEDURE — 1090F PRES/ABSN URINE INCON ASSESS: CPT | Performed by: NURSE PRACTITIONER

## 2025-05-01 PROCEDURE — G8427 DOCREV CUR MEDS BY ELIG CLIN: HCPCS | Performed by: NURSE PRACTITIONER

## 2025-05-01 PROCEDURE — 1159F MED LIST DOCD IN RCRD: CPT | Performed by: NURSE PRACTITIONER

## 2025-05-01 PROCEDURE — 1123F ACP DISCUSS/DSCN MKR DOCD: CPT | Performed by: NURSE PRACTITIONER

## 2025-05-01 ASSESSMENT — ENCOUNTER SYMPTOMS
SHORTNESS OF BREATH: 0
ORTHOPNEA: 0

## 2025-05-01 NOTE — PROGRESS NOTES
04/2025:   Stable remote monitoring noted.  100% at fib   Continue with Q3 month monitoring        Tests ordered:  none  Follow-up  6 mo     Signed:  SHASHI Vazquez CNP, 5/1/2025, 3:40 PM    An electronic signature was used to authenticate this note.    Please note this report has been partially produced using speech recognition software and may contain errors related to that system including errors in grammar, punctuation, and spelling, as well as words and phrases that may be inappropriate. If there are any questions or concerns please feel free to contact the dictating provider for clarification.

## 2025-05-27 DIAGNOSIS — I48.20 CHRONIC ATRIAL FIBRILLATION (HCC): Primary | ICD-10-CM

## 2025-05-27 NOTE — TELEPHONE ENCOUNTER
she has not rec'd her eliquis from [facility] yet she is totally out can she get some today . Please call and let her know

## 2025-06-12 ENCOUNTER — TELEPHONE (OUTPATIENT)
Dept: CARDIOLOGY CLINIC | Age: 85
End: 2025-06-12

## 2025-06-12 NOTE — TELEPHONE ENCOUNTER
Patient called in and was requesting Eliquis samples, pt is out of Eliquis while waiting on a refill, she is wanting to know if she can come in and  some samples today.    Please call patient back and advise.

## 2025-07-03 RX ORDER — LISINOPRIL 20 MG/1
20 TABLET ORAL DAILY
Qty: 90 TABLET | Refills: 3 | Status: SHIPPED | OUTPATIENT
Start: 2025-07-03

## 2025-08-26 ENCOUNTER — TELEPHONE (OUTPATIENT)
Dept: CARDIOLOGY CLINIC | Age: 85
End: 2025-08-26

## 2025-09-02 RX ORDER — METOPROLOL TARTRATE 25 MG/1
25 TABLET, FILM COATED ORAL 2 TIMES DAILY
Qty: 180 TABLET | Refills: 3 | OUTPATIENT
Start: 2025-09-02